# Patient Record
Sex: FEMALE | Race: WHITE | NOT HISPANIC OR LATINO | Employment: UNEMPLOYED | URBAN - METROPOLITAN AREA
[De-identification: names, ages, dates, MRNs, and addresses within clinical notes are randomized per-mention and may not be internally consistent; named-entity substitution may affect disease eponyms.]

---

## 2017-12-18 ENCOUNTER — OFFICE VISIT (OUTPATIENT)
Dept: NEUROSURGERY | Facility: CLINIC | Age: 37
End: 2017-12-18

## 2017-12-18 VITALS
BODY MASS INDEX: 34.43 KG/M2 | TEMPERATURE: 99.6 F | WEIGHT: 170.8 LBS | SYSTOLIC BLOOD PRESSURE: 90 MMHG | HEIGHT: 59 IN | DIASTOLIC BLOOD PRESSURE: 60 MMHG

## 2017-12-18 DIAGNOSIS — M51.36 DEGENERATIVE DISC DISEASE, LUMBAR: Primary | ICD-10-CM

## 2017-12-18 PROCEDURE — 99203 OFFICE O/P NEW LOW 30 MIN: CPT | Performed by: NEUROLOGICAL SURGERY

## 2017-12-18 RX ORDER — QUETIAPINE FUMARATE 100 MG/1
TABLET, FILM COATED ORAL
Refills: 3 | COMMUNITY
Start: 2017-12-13 | End: 2019-05-14 | Stop reason: ALTCHOICE

## 2017-12-18 RX ORDER — OXCARBAZEPINE 300 MG/1
TABLET, FILM COATED ORAL
Refills: 5 | COMMUNITY
Start: 2017-12-02 | End: 2018-07-02 | Stop reason: ALTCHOICE

## 2017-12-18 RX ORDER — FLUCONAZOLE 150 MG/1
TABLET ORAL
Refills: 0 | COMMUNITY
Start: 2017-12-02 | End: 2018-07-02

## 2017-12-18 RX ORDER — RIZATRIPTAN BENZOATE 10 MG/1
TABLET ORAL
Refills: 5 | COMMUNITY
Start: 2017-12-13

## 2017-12-18 RX ORDER — TIZANIDINE 4 MG/1
TABLET ORAL
Refills: 3 | COMMUNITY
Start: 2017-12-11 | End: 2018-07-02 | Stop reason: ALTCHOICE

## 2017-12-18 RX ORDER — CLONAZEPAM 0.5 MG/1
TABLET ORAL
Refills: 0 | COMMUNITY
Start: 2017-12-06 | End: 2020-09-11 | Stop reason: ALTCHOICE

## 2017-12-18 RX ORDER — CIPROFLOXACIN 500 MG/1
TABLET, FILM COATED ORAL
Refills: 0 | COMMUNITY
Start: 2017-12-02 | End: 2018-07-02

## 2017-12-18 RX ORDER — TRAMADOL HYDROCHLORIDE 50 MG/1
TABLET ORAL
COMMUNITY
Start: 2017-12-14 | End: 2018-07-02 | Stop reason: ALTCHOICE

## 2017-12-18 RX ORDER — METOPROLOL SUCCINATE 25 MG/1
TABLET, EXTENDED RELEASE ORAL
Refills: 3 | COMMUNITY
Start: 2017-10-23 | End: 2018-07-02 | Stop reason: DRUGHIGH

## 2017-12-18 RX ORDER — NABUMETONE 750 MG/1
TABLET, FILM COATED ORAL
Refills: 0 | COMMUNITY
Start: 2017-11-27 | End: 2019-05-14 | Stop reason: ALTCHOICE

## 2017-12-18 RX ORDER — METOPROLOL SUCCINATE 100 MG/1
TABLET, EXTENDED RELEASE ORAL
COMMUNITY
Start: 2017-12-12 | End: 2019-05-14 | Stop reason: ALTCHOICE

## 2017-12-18 RX ORDER — TEMAZEPAM 30 MG/1
CAPSULE ORAL
Refills: 5 | COMMUNITY
Start: 2017-12-13 | End: 2018-07-02 | Stop reason: ALTCHOICE

## 2017-12-18 RX ORDER — QUETIAPINE FUMARATE 50 MG/1
TABLET, FILM COATED ORAL
Refills: 3 | COMMUNITY
Start: 2017-11-14 | End: 2018-07-02 | Stop reason: DRUGHIGH

## 2017-12-18 RX ORDER — BUPROPION HYDROCHLORIDE 150 MG/1
150 TABLET, EXTENDED RELEASE ORAL DAILY
Refills: 3 | COMMUNITY
Start: 2017-12-11

## 2017-12-18 RX ORDER — CYCLOBENZAPRINE HCL 10 MG
TABLET ORAL
Refills: 0 | COMMUNITY
Start: 2017-11-27 | End: 2017-12-18

## 2017-12-18 NOTE — PROGRESS NOTES
"Miriam Power  1980  [id number]      Chief Complaint   Patient presents with   • Back Pain   • Leg Pain       HISTORY OF PRESENT ILLNESS:  This is a 36-year-old female who has an approximately 14-15 year history of intermittent back pain which radiates to her left hip into her lower extremity.  She has had several episodes most of which lend themselves to correction with rest and/or chiropractic mobilization.  This is been the most severe she is experienced.  It has not improved over the past 2 weeks.  This was initiated by an attempt to \"workout\" with a .  In her left leg radiating into the back of the leg to her heel on the left side.  She subsequently developed numbness tingling and pain in her right leg associated with bladder incontinence.  A lumbar MRI was performed and she is referred for neurosurgical consultation.    As history is relevant in that she has seizure activity and is on medications.  No precise diagnosis had been forthcoming.  She is also a suspect for multiple sclerosis.  She follows a neurologist every 3 months for follow-up.     Past Medical History:   Diagnosis Date   • Frequent headaches    • Hypertension    • Seizures        Past Surgical History:   Procedure Laterality Date   • BREAST IMPLANT SURGERY     • TUBAL ABDOMINAL LIGATION         Family History   Problem Relation Age of Onset   • No Known Problems Mother    • No Known Problems Father        Social History     Social History   • Marital status:      Spouse name: N/A   • Number of children: N/A   • Years of education: N/A     Occupational History   • Not on file.     Social History Main Topics   • Smoking status: Never Smoker   • Smokeless tobacco: Never Used   • Alcohol use No   • Drug use: No   • Sexual activity: Defer     Other Topics Concern   • Not on file     Social History Narrative   • No narrative on file       Allergies   Allergen Reactions   • Contrast Dye      Only to CT scan dye "         Current Outpatient Prescriptions:   •  buPROPion SR (WELLBUTRIN SR) 150 MG 12 hr tablet, TAKE 1 TABLET BY MOUTH TWICE A DAY, Disp: , Rfl: 3  •  ciprofloxacin (CIPRO) 500 MG tablet, TAKE 1 TABLET TWICE A DAY, Disp: , Rfl: 0  •  clonazePAM (KlonoPIN) 0.5 MG tablet, TAKE 1 TABLET TWICE A DAY BY MOUTH AS NEEDED, Disp: , Rfl: 0  •  cyclobenzaprine (FLEXERIL) 10 MG tablet, TAKE 1 TABLET BY MOUTH 3 TIMES A DAY AS NEEDED, Disp: , Rfl: 0  •  fluconazole (DIFLUCAN) 150 MG tablet, TAKE 1 TABLET BY MOUTH DAILY, Disp: , Rfl: 0  •  metoprolol succinate XL (TOPROL-XL) 100 MG 24 hr tablet, , Disp: , Rfl:   •  metoprolol succinate XL (TOPROL-XL) 25 MG 24 hr tablet, TAKE 1 TABLET TWICE A DAY BY MOUTH, Disp: , Rfl: 3  •  nabumetone (RELAFEN) 750 MG tablet, TAKE 1 TABLET BY MOUTH TWICE A DAY AS NEEDED, Disp: , Rfl: 0  •  OXcarbazepine (TRILEPTAL) 300 MG tablet, TAKE 2 TABLETS BY MOUTH TWICE A DAY, Disp: , Rfl: 5  •  QUEtiapine (SEROquel) 100 MG tablet, TAKE 1 TABLET BY MOUTH AT BEDTIME, Disp: , Rfl: 3  •  QUEtiapine (SEROquel) 50 MG tablet, TAKE 1 TABLET BY MOUTH EVERY DAY AT BEDTIME, Disp: , Rfl: 3  •  rizatriptan (MAXALT) 10 MG tablet, TAKE 1 TAB AS NEEDED FOR HEADACHE. MAY REPEAT 1 TAB WITHIN 2 HOURS. MAX 2 TABS/24HRS. NO OTHER TRIPT, Disp: , Rfl: 5  •  temazepam (RESTORIL) 30 MG capsule, TAKE ONE CAPSULE AT BEDTIME, Disp: , Rfl: 5  •  tiZANidine (ZANAFLEX) 4 MG tablet, TAKE 1 TABLET BY MOUTH EVERY NIGHT AT BEDTIME, Disp: , Rfl: 3  •  topiramate (TOPAMAX) 200 MG tablet, TAKE 1 TABLET AT BEDTIME, Disp: , Rfl: 3  •  traMADol (ULTRAM) 50 MG tablet, , Disp: , Rfl:     Review of Systems   Constitutional: Negative for activity change, appetite change, chills, diaphoresis, fatigue, fever and unexpected weight change.   HENT: Negative for congestion, dental problem, drooling, ear discharge, ear pain, facial swelling, hearing loss, mouth sores, nosebleeds, postnasal drip, rhinorrhea, sinus pressure, sneezing, sore throat, tinnitus,  "trouble swallowing and voice change.    Eyes: Negative for photophobia, pain, discharge, redness, itching and visual disturbance.   Respiratory: Negative for apnea, cough, choking, chest tightness, shortness of breath, wheezing and stridor.    Cardiovascular: Negative for chest pain, palpitations and leg swelling.   Gastrointestinal: Positive for constipation. Negative for abdominal distention, abdominal pain, anal bleeding, blood in stool, diarrhea, nausea, rectal pain and vomiting.   Endocrine: Negative for cold intolerance, heat intolerance, polydipsia, polyphagia and polyuria.   Genitourinary: Positive for difficulty urinating, enuresis, frequency, pelvic pain and urgency. Negative for decreased urine volume, flank pain, genital sores and hematuria.   Musculoskeletal: Positive for arthralgias and back pain. Negative for gait problem, joint swelling, myalgias, neck pain and neck stiffness.   Skin: Negative for color change, pallor, rash and wound.   Allergic/Immunologic: Negative for environmental allergies, food allergies and immunocompromised state.   Neurological: Negative for dizziness, tremors, seizures, syncope, facial asymmetry, speech difficulty, weakness, light-headedness, numbness and headaches.   Hematological: Negative for adenopathy. Does not bruise/bleed easily.   Psychiatric/Behavioral: Positive for dysphoric mood and sleep disturbance. Negative for agitation, behavioral problems, confusion, decreased concentration, hallucinations, self-injury and suicidal ideas. The patient is not nervous/anxious and is not hyperactive.    All other systems reviewed and are negative.      Vitals:    12/18/17 1236   BP: 90/60   BP Location: Right arm   Patient Position: Standing   Cuff Size: Adult   Temp: 99.6 °F (37.6 °C)   TempSrc: Temporal Artery    Weight: 77.5 kg (170 lb 12.8 oz)   Height: 149.9 cm (59\")       Neurological Examination:      Mental status/speech: The patient is alert and oriented.  Speech is " clear without aphysia or dysarthria.  No overt cognitive deficits.    Cranial nerve examination:    Olfaction: Smell is intact.  Vision: Vision is intact without visual field abnormalities.  Funduscopic examination is normal.  No pupillary irregularity.  Ocular motor examination: The extraocular muscles are intact.  There is no diplopia.  The pupil is round and reactive to both light and accommodation.  There is no nystagmus.  Facial movement/sensation: There is no facial weakness.  Sensation is intact in the first, second, and third divisions of the trigeminal nerve.  The corneal reflex is intact.  Auditory: Hearing is intact to finger rub bilaterally.  Cranial nerves IX, X, XI, XII: Phonation is normal.  No dysphagia.  Tongue is protruded in the midline without atrophy.  The gag reflex is intact.  Shoulder shrug is normal.    Musculoligamentous ligamentous examination: She had restriction range of motion lumbar spine.  Straight leg raising, [place] and flip test are negative.  She has a positive Rangel's sign on the left and not on the right.  Her strength appears to be intact.  I find no evidence of weakness, sensory loss or reflex asymmetry.  Her gait is normal.         Medical Decision Making:     Diagnostic Data Set:  The lumbar MRI data set shows mild disc degeneration particularly at L5-S1 with an extreme lateral disc protrusion and narrowing of the neural foramen on the left      Assessment:  Degenerative disc disease          Recommendations:  I am unable to explain the bladder issues or the bilaterality of her symptoms on her examination or the lumbar MRI.  Further studies are warranted given his clinical presentation.  I have ordered a CT of the pelvis; bone scan of the spine including the pelvis as well as EMG/NCV of both lower extremities.  We'll follow her up in the [place] neurosurgery clinic subsequently.    I've send her to physical therapy and [place].  I've taken her off work until further  notice.        I greatly appreciate the opportunity to see and evaluate this individual.  If you have questions or concerns regarding issues that I may have overlooked please call me at any time: [phone].  Nickolas Todd M.D.  [facility]  [address] [facility].  [facility]  [id number]

## 2018-01-08 ENCOUNTER — HOSPITAL ENCOUNTER (OUTPATIENT)
Dept: CT IMAGING | Facility: HOSPITAL | Age: 38
Discharge: HOME | End: 2018-01-08
Attending: NEUROLOGICAL SURGERY

## 2018-01-08 ENCOUNTER — HOSPITAL ENCOUNTER (OUTPATIENT)
Dept: NUCLEAR MEDICINE | Facility: HOSPITAL | Age: 38
Discharge: HOME | End: 2018-01-08
Attending: NEUROLOGICAL SURGERY

## 2018-01-08 PROCEDURE — 78300 BONE IMAGING LIMITED AREA: CPT

## 2018-01-08 PROCEDURE — 74176 CT ABD & PELVIS W/O CONTRAST: CPT | Performed by: RADIOLOGY

## 2018-01-08 PROCEDURE — 0 TECHNETIUM OXIDRONATE KIT: Performed by: NEUROLOGICAL SURGERY

## 2018-01-08 PROCEDURE — 78300 BONE IMAGING LIMITED AREA: CPT | Performed by: RADIOLOGY

## 2018-01-08 PROCEDURE — 74176 CT ABD & PELVIS W/O CONTRAST: CPT

## 2018-01-08 PROCEDURE — A9561 TC99M OXIDRONATE: HCPCS | Performed by: NEUROLOGICAL SURGERY

## 2018-01-08 RX ADMIN — TECHNETIUM TC 99M OXIDRONATE 1 DOSE: 3.15 INJECTION, POWDER, LYOPHILIZED, FOR SOLUTION INTRAVENOUS at 08:45

## 2018-06-22 ENCOUNTER — TRANSCRIBE ORDERS (OUTPATIENT)
Dept: CARDIOLOGY | Facility: CLINIC | Age: 38
End: 2018-06-22

## 2018-06-22 DIAGNOSIS — R06.02 SHORTNESS OF BREATH: Primary | ICD-10-CM

## 2018-06-22 DIAGNOSIS — R53.83 FATIGUE, UNSPECIFIED TYPE: ICD-10-CM

## 2018-07-02 ENCOUNTER — CONSULT (OUTPATIENT)
Dept: CARDIOLOGY | Facility: CLINIC | Age: 38
End: 2018-07-02

## 2018-07-02 VITALS
HEART RATE: 77 BPM | BODY MASS INDEX: 35.28 KG/M2 | HEIGHT: 59 IN | SYSTOLIC BLOOD PRESSURE: 112 MMHG | WEIGHT: 175 LBS | DIASTOLIC BLOOD PRESSURE: 78 MMHG

## 2018-07-02 DIAGNOSIS — R06.02 SHORTNESS OF BREATH: ICD-10-CM

## 2018-07-02 DIAGNOSIS — E78.00 HYPERCHOLESTEREMIA: ICD-10-CM

## 2018-07-02 DIAGNOSIS — I10 ESSENTIAL HYPERTENSION: Primary | ICD-10-CM

## 2018-07-02 DIAGNOSIS — R00.2 PALPITATIONS: ICD-10-CM

## 2018-07-02 DIAGNOSIS — E88.81 METABOLIC SYNDROME: ICD-10-CM

## 2018-07-02 DIAGNOSIS — R07.89 CHEST PRESSURE: ICD-10-CM

## 2018-07-02 DIAGNOSIS — R51.9 HEADACHE AROUND THE EYES: ICD-10-CM

## 2018-07-02 DIAGNOSIS — R00.0 TACHYCARDIA: ICD-10-CM

## 2018-07-02 PROBLEM — E88.810 METABOLIC SYNDROME: Status: ACTIVE | Noted: 2018-07-02

## 2018-07-02 PROCEDURE — 99244 OFF/OP CNSLTJ NEW/EST MOD 40: CPT | Performed by: INTERNAL MEDICINE

## 2018-07-02 PROCEDURE — 93000 ELECTROCARDIOGRAM COMPLETE: CPT | Performed by: INTERNAL MEDICINE

## 2018-07-02 RX ORDER — ATORVASTATIN CALCIUM 10 MG/1
10 TABLET, FILM COATED ORAL DAILY
Qty: 30 TABLET | Refills: 11 | Status: SHIPPED | OUTPATIENT
Start: 2018-07-02 | End: 2019-05-14 | Stop reason: SDUPTHER

## 2018-07-02 RX ORDER — ONDANSETRON 4 MG/1
4 TABLET, FILM COATED ORAL EVERY 8 HOURS PRN
COMMUNITY

## 2018-07-02 RX ORDER — FLUTICASONE PROPIONATE 50 MCG
2 SPRAY, SUSPENSION (ML) NASAL DAILY PRN
COMMUNITY
End: 2020-09-11 | Stop reason: ALTCHOICE

## 2018-07-02 RX ORDER — FUROSEMIDE 20 MG/1
20 TABLET ORAL DAILY
COMMUNITY
End: 2019-05-14 | Stop reason: ALTCHOICE

## 2018-07-02 RX ORDER — PROMETHAZINE HYDROCHLORIDE 25 MG/1
25 TABLET ORAL EVERY 6 HOURS PRN
COMMUNITY

## 2018-07-02 NOTE — PATIENT INSTRUCTIONS
Mediterranean Diet  A Mediterranean diet refers to food and lifestyle choices that are based on the traditions of countries located on the [place]. This way of eating has been shown to help prevent certain conditions and improve outcomes for people who have chronic diseases, like kidney disease and heart disease.  What are tips for following this plan?  Lifestyle  · Cook and eat meals together with your family, when possible.  · Drink enough fluid to keep your urine clear or pale yellow.  · Be physically active every day. This includes:  ? Aerobic exercise like running or swimming.  ? Leisure activities like gardening, walking, or housework.  · Get 7-8 hours of sleep each night.  · If recommended by your health care provider, drink red wine in moderation. This means 1 glass a day for nonpregnant women and 2 glasses a day for men. A glass of wine equals 5 oz (150 mL).  Reading food labels  · Check the serving size of packaged foods. For foods such as rice and pasta, the serving size refers to the amount of cooked product, not dry.  · Check the total fat in packaged foods. Avoid foods that have saturated fat or trans fats.  · Check the ingredients list for added sugars, such as corn syrup.  Shopping  · At the grocery store, buy most of your food from the areas near the walls of the store. This includes:  ? Fresh fruits and vegetables (produce).  ? Grains, beans, nuts, and seeds. Some of these may be available in unpackaged forms or large amounts (in bulk).  ? Fresh seafood.  ? Poultry and eggs.  ? Low-fat dairy products.  · Buy whole ingredients instead of prepackaged foods.  · Buy fresh fruits and vegetables in-season from local farmers markets.  · Buy frozen fruits and vegetables in resealable bags.  · If you do not have access to quality fresh seafood, buy precooked frozen shrimp or canned fish, such as tuna, salmon, or sardines.  · Buy small amounts of raw or cooked vegetables, salads, or olives from the  deli or salad bar at your store.  · Stock your pantry so you always have certain foods on hand, such as olive oil, canned tuna, canned tomatoes, rice, pasta, and beans.  Cooking  · Cook foods with extra-virgin olive oil instead of using butter or other vegetable oils.  · Have meat as a side dish, and have vegetables or grains as your main dish. This means having meat in small portions or adding small amounts of meat to foods like pasta or stew.  · Use beans or vegetables instead of meat in common dishes like chili or lasagna.  · [place] with different cooking methods. Try roasting or broiling vegetables instead of steaming or sautéeing them.  · Add frozen vegetables to soups, stews, pasta, or rice.  · Add nuts or seeds for added healthy fat at each meal. You can add these to yogurt, salads, or vegetable dishes.  · Marinate fish or vegetables using olive oil, lemon juice, garlic, and fresh herbs.  Meal planning  · Plan to eat 1 vegetarian meal one day each week. Try to work up to 2 vegetarian meals, if possible.  · Eat seafood 2 or more times a week.  · Have healthy snacks readily available, such as:  ? Vegetable sticks with hummus.  ? Greek yogurt.  ? Fruit and nut trail mix.  · Eat balanced meals throughout the week. This includes:  ? Fruit: 2-3 servings a day  ? Vegetables: 4-5 servings a day  ? Low-fat dairy: 2 servings a day  ? Fish, poultry, or lean meat: 1 serving a day  ? Beans and legumes: 2 or more servings a week  ? Nuts and seeds: 1-2 servings a day  ? Whole grains: 6-8 servings a day  ? Extra-virgin olive oil: 3-4 servings a day  · Limit red meat and sweets to only a few servings a month  What are my food choices?  · Mediterranean diet  ? Recommended  ? Grains: Whole-grain pasta. Brown rice. Bulgar wheat. Polenta. Couscous. Whole-wheat bread. Oatmeal. Quinoa.  ? Vegetables: Artichokes. Beets. Broccoli. Cabbage. Carrots. Eggplant. Green beans. Chard. Kale. Spinach. Onions. Leeks. Peas. Squash.  Tomatoes. Peppers. Radishes.  ? Fruits: Apples. Apricots. Avocado. Berries. Bananas. Cherries. Dates. Figs. Grapes. Janeth. Melon. Oranges. Peaches. Plums. Pomegranate.  ? Meats and other protein foods: Beans. Almonds. Sunflower seeds. Pine nuts. Peanuts. Cod. [place]. Scallops. Shrimp. Tuna. Tilapia. Clams. Oysters. Eggs.  ? Dairy: Low-fat milk. Cheese. Greek yogurt.  ? Beverages: Water. Red wine. Herbal tea.  ? Fats and oils: Extra virgin olive oil. Avocado oil. Grape seed oil.  ? Sweets and desserts: Greek yogurt with honey. Baked apples. Poached pears. Trail mix.  ? Seasoning and other foods: Basil. Cilantro. Coriander. Cumin. Mint. Parsley. Ronny. Rosemary. Tarragon. Garlic. Oregano. Thyme. Pepper. Balsalmic vinegar. Tahini. Hummus. Tomato sauce. Olives. Mushrooms.  ? Limit these  ? Grains: Prepackaged pasta or rice dishes. Prepackaged cereal with added sugar.  ? Vegetables: Deep fried potatoes (french fries).  ? Fruits: Fruit canned in syrup.  ? Meats and other protein foods: Beef. Pork. Lamb. Poultry with skin. Hot dogs. Munoz.  ? Dairy: Ice cream. Sour cream. Whole milk.  ? Beverages: Juice. Sugar-sweetened soft drinks. Beer. Liquor and spirits.  ? Fats and oils: Butter. Canola oil. Vegetable oil. Beef fat (tallow). Lard.  ? Sweets and desserts: Cookies. Cakes. Pies. Candy.  ? Seasoning and other foods: Mayonnaise. Premade sauces and marinades.  ? The items listed may not be a complete list. Talk with your dietitian about what dietary choices are right for you.  Summary  · The Mediterranean diet includes both food and lifestyle choices.  · Eat a variety of fresh fruits and vegetables, beans, nuts, seeds, and whole grains.  · Limit the amount of red meat and sweets that you eat.  · Talk with your health care provider about whether it is safe for you to drink red wine in moderation. This means 1 glass a day for nonpregnant women and 2 glasses a day for men. A glass of wine equals 5 oz (150 mL).  This information  is not intended to replace advice given to you by your health care provider. Make sure you discuss any questions you have with your health care provider.  Document Released: 08/10/2017 Document Revised: 09/12/2017 Document Reviewed: 08/10/2017  [employer] Interactive Patient Education © 2018 [employer]

## 2018-07-02 NOTE — PROGRESS NOTES
CARDIAC COMPLAINTS  chest pressure/discomfort, dyspnea, fatigue, lower extremity edema and palpitations      Subjective   Miriam Power is a 37 y.o. female came in today for her initial cardiac evaluation.  She has history of hypertension diagnosed recently.  She also has history of significant depression, history of recurrent left-sided headache which sometime associated with partial complex seizures.  She also has been gaining weight recently.  She has been having some abdominal discomfort and has undergone EGD and colonoscopy also.  She started working out at the gym with one of her friends.  She started noticing during the workout, episodes of chest tightness in the form of pressure-like feeling associated with increasing shortness of breath and nausea.  These episodes results after resting for a few minutes.  She had 3 episodes of this symptoms.  She stopped going to the gym since then.  She also has been noticing increasing shortness of breath for the last few months.  She has been noticing bilateral leg edema in both lower extremities.  She also has been having episodes of palpitation in the form of rapid heartbeat.  She has undergone overnight pulse PO2 measurement and sleep apnea has been ruled out.  She has undergone some lab work at your office.  Her total cholesterol was 177, LDL of 107 and triglyceride of 158.  Her creatinine is slightly elevated at 1.45 with a GFR of 43.  Her TSH was normal.  I don't think she has undergone a UA at this time.  She is not a smoker and hypercholesterolemia runs in the family.    Past Surgical History:   Procedure Laterality Date   • BREAST IMPLANT SURGERY     • ECHO - CONVERTED  11/15/2015    @[email or web address]. - EF 60%   • TUBAL ABDOMINAL LIGATION         Current Outpatient Prescriptions   Medication Sig Dispense Refill   • buPROPion SR (WELLBUTRIN SR) 150 MG 12 hr tablet TAKE 1 TABLET BY MOUTH TWICE A DAY  3   • clonazePAM (KlonoPIN) 0.5 MG tablet TAKE 1 TABLET  Three times  A DAY BY MOUTH AS NEEDED  0   • fluticasone (FLONASE) 50 MCG/ACT nasal spray 2 sprays into each nostril Daily As Needed for Rhinitis.     • furosemide (LASIX) 20 MG tablet Take 20 mg by mouth Daily.     • metoprolol succinate XL (TOPROL-XL) 100 MG 24 hr tablet 1/2 tab daily     • nabumetone (RELAFEN) 750 MG tablet TAKE 1 TABLET BY MOUTH TWICE A DAY AS NEEDED  0   • ondansetron (ZOFRAN) 4 MG tablet Take 4 mg by mouth Every 8 (Eight) Hours As Needed for Nausea or Vomiting.     • promethazine (PHENERGAN) 25 MG tablet Take 25 mg by mouth Every 6 (Six) Hours As Needed for Nausea or Vomiting.     • QUEtiapine (SEROquel) 100 MG tablet TAKE 1 TABLET BY MOUTH AT BEDTIME  3   • rizatriptan (MAXALT) 10 MG tablet TAKE 1 TAB AS NEEDED FOR HEADACHE. MAY REPEAT 1 TAB WITHIN 2 HOURS. MAX 2 TABS/24HRS. NO OTHER TRIPT  5   • topiramate (TOPAMAX) 200 MG tablet TAKE 1 TABLET twice a day  3   • aspirin 81 MG tablet Take 1 tablet by mouth Daily. 30 tablet 11   • atorvastatin (LIPITOR) 10 MG tablet Take 1 tablet by mouth Daily. 30 tablet 11     No current facility-administered medications for this visit.            ALLERGIES:  Contrast dye    Past Medical History:   Diagnosis Date   • Frequent headaches    • History of suicide attempt    • History of tubal ligation    • Hypertension    • Hyperthyroidism    • Panic attack    • Seizures    • Vitamin D deficiency        History   Smoking Status   • Never Smoker   Smokeless Tobacco   • Never Used          Family History   Problem Relation Age of Onset   • Hyperlipidemia Mother    • No Known Problems Father    • No Known Problems Brother        Review of Systems   Constitution: Positive for malaise/fatigue and weight gain. Negative for decreased appetite.   HENT: Negative for congestion and sore throat.    Eyes: Negative for blurred vision.   Cardiovascular: Positive for chest pain, dyspnea on exertion and palpitations.   Respiratory: Positive for shortness of breath. Negative for  "snoring.    Endocrine: Negative for cold intolerance and heat intolerance.   Hematologic/Lymphatic: Negative for adenopathy. Does not bruise/bleed easily.   Skin: Negative for itching, nail changes and skin cancer.   Musculoskeletal: Positive for arthritis. Negative for myalgias.   Gastrointestinal: Negative for abdominal pain, dysphagia and heartburn.   Genitourinary: Negative for bladder incontinence and frequency.   Neurological: Negative for dizziness, light-headedness, seizures and vertigo.   Psychiatric/Behavioral: Positive for depression and suicidal ideas. Negative for altered mental status.   Allergic/Immunologic: Negative for environmental allergies and hives.       Diabetes- No  Thyroid- normal    Objective     /78 (BP Location: Right arm)   Pulse 77   Ht 149.9 cm (59\")   Wt 79.4 kg (175 lb)   BMI 35.35 kg/m²     Physical Exam   Constitutional: She is oriented to person, place, and time. She appears well-developed and well-nourished.   HENT:   Head: Normocephalic.   Nose: Nose normal.   Eyes: EOM are normal. Pupils are equal, round, and reactive to light.   Neck: Normal range of motion. Neck supple.   Cardiovascular: Normal rate, regular rhythm, S1 normal and S2 normal.    Murmur heard.  Pulmonary/Chest: Effort normal and breath sounds normal.   Abdominal: Soft. Bowel sounds are normal.   Musculoskeletal: Normal range of motion. She exhibits no edema.   Neurological: She is alert and oriented to person, place, and time.   Skin: Skin is warm and dry.   Psychiatric: She has a normal mood and affect.         ECG 12 Lead  Date/Time: 7/2/2018 12:07 PM  Performed by: ROSSY PETERSON  Authorized by: ROSSY PETERSON   Previous ECG: no previous ECG available  Rhythm: sinus rhythm  Rate: normal  QRS axis: normal  Clinical impression: non-specific ECG              Assessment/Plan   Patient's Body mass index is 35.35 kg/m². BMI is above normal parameters. Recommendations include: educational " material, exercise counseling and nutrition counseling.     Miriam was seen today for establish care, chest pain, shortness of breath, edema, rapid heart rate, sleep apnea and labs.    Diagnoses and all orders for this visit:    Essential hypertension    Palpitations  -     Stress Test With Myocardial Perfusion One Day; Future    Hypercholesteremia  -     atorvastatin (LIPITOR) 10 MG tablet; Take 1 tablet by mouth Daily.    Chest pressure  -     Stress Test With Myocardial Perfusion One Day; Future    Shortness of breath  -     Adult Transthoracic Echo Complete W/ Cont if Necessary Per Protocol; Future    Tachycardia  -     Adult Transthoracic Echo Complete W/ Cont if Necessary Per Protocol; Future    Metabolic syndrome    Headache around the eyes  -     Adult Transthoracic Echo Complete W/ Cont if Necessary Per Protocol; Future    At baseline, her heart rate and blood pressure appears stable.  Her BMI is 35.  Her EKG showed normal sinus rhythm with nonspecific ST-T changes.  Had a long talk with her about diet, exercise and weight reduction.  I talked to her about the Mediterranean diet.  Her clinical examination reveals short systolic murmur at the mitral area.  There is no edema at this time.  I talked to her about avoiding Lasix.  She may need a UA to rule out microalbuminuria.  I scheduled her to undergo an echocardiogram to evaluate the LV function, valvular structures and the PA pressure.  Also scheduled her to undergo a stress test to evaluate her functional status, chronotropic response and also to rule out stress-induced ischemia.  Regarding her hypercholesterolemia, I started her on Lipitor 10 mg once a day.  I also advised her to be on aspirin 81 mg once a day.  Based on the results of these tests, further recommendations will be made.                Electronically signed by Rangel Alfred MD July 2, 2018 11:58 AM

## 2018-07-12 ENCOUNTER — HOSPITAL ENCOUNTER (OUTPATIENT)
Dept: CARDIOLOGY | Facility: HOSPITAL | Age: 38
Discharge: HOME | End: 2018-07-12
Attending: INTERNAL MEDICINE

## 2018-07-12 ENCOUNTER — OUTSIDE FACILITY SERVICE (OUTPATIENT)
Dept: CARDIOLOGY | Facility: CLINIC | Age: 38
End: 2018-07-12

## 2018-07-12 DIAGNOSIS — R00.2 PALPITATIONS: ICD-10-CM

## 2018-07-12 DIAGNOSIS — R06.02 SHORTNESS OF BREATH: ICD-10-CM

## 2018-07-12 DIAGNOSIS — R51.9 HEADACHE AROUND THE EYES: ICD-10-CM

## 2018-07-12 DIAGNOSIS — R00.0 TACHYCARDIA: ICD-10-CM

## 2018-07-12 DIAGNOSIS — R07.89 CHEST PRESSURE: ICD-10-CM

## 2018-07-12 LAB
MAXIMAL PREDICTED HEART RATE: 183 BPM
MAXIMAL PREDICTED HEART RATE: 183 BPM
STRESS TARGET HR: 156 BPM
STRESS TARGET HR: 156 BPM

## 2018-07-12 PROCEDURE — 93018 CV STRESS TEST I&R ONLY: CPT | Performed by: INTERNAL MEDICINE

## 2018-07-12 PROCEDURE — 78452 HT MUSCLE IMAGE SPECT MULT: CPT | Performed by: INTERNAL MEDICINE

## 2018-07-12 PROCEDURE — A9500 TC99M SESTAMIBI: HCPCS | Performed by: INTERNAL MEDICINE

## 2018-07-12 PROCEDURE — 78452 HT MUSCLE IMAGE SPECT MULT: CPT

## 2018-07-12 PROCEDURE — 93306 TTE W/DOPPLER COMPLETE: CPT | Performed by: INTERNAL MEDICINE

## 2018-07-12 PROCEDURE — 93017 CV STRESS TEST TRACING ONLY: CPT

## 2018-07-12 PROCEDURE — 0 TECHNETIUM SESTAMIBI: Performed by: INTERNAL MEDICINE

## 2018-07-12 PROCEDURE — 93306 TTE W/DOPPLER COMPLETE: CPT

## 2018-07-12 RX ADMIN — TECHNETIUM TC 99M SESTAMIBI 1 DOSE: 1 INJECTION INTRAVENOUS at 14:30

## 2018-07-17 ENCOUNTER — TELEPHONE (OUTPATIENT)
Dept: CARDIOLOGY | Facility: CLINIC | Age: 38
End: 2018-07-17

## 2018-07-17 RX ORDER — LISINOPRIL 5 MG/1
5 TABLET ORAL DAILY
Qty: 90 TABLET | Refills: 3 | Status: SHIPPED | OUTPATIENT
Start: 2018-07-17 | End: 2019-05-14 | Stop reason: ALTCHOICE

## 2018-07-17 NOTE — TELEPHONE ENCOUNTER
Patient aware of stress test and echo bubble study results and recommendations.    Echo bubble study is normal.  Normal LV function. Cannot rule out anterior wall ischemia versus breast attenuation, hypertensive BP response.  Add Lisinopril 5 mg once a day and patient aware to call the office if symptoms persist.

## 2019-05-09 ENCOUNTER — TELEPHONE (OUTPATIENT)
Dept: CARDIOLOGY | Facility: CLINIC | Age: 39
End: 2019-05-09

## 2019-05-10 NOTE — TELEPHONE ENCOUNTER
I spoke with patient.  She reports she seen CARLOS Bah yesterday.  CXR and labs done.  Per patient, Monica called her last night and advised she needs a follow up here and may need repeat echo.  She has been having problems with edema, SOA.  She reports PCP started her on Bumex 1-2 weeks ago, but still having problems.    Patient aware follow up-next Tuesday 5/14/19 at 9:45 am with CARLOS Escobedo.

## 2019-05-10 NOTE — TELEPHONE ENCOUNTER
Left message at PCP office requesting last office visit and any labs or recent testing be faxed to our office.

## 2019-05-14 ENCOUNTER — OFFICE VISIT (OUTPATIENT)
Dept: CARDIOLOGY | Facility: CLINIC | Age: 39
End: 2019-05-14

## 2019-05-14 VITALS
WEIGHT: 217 LBS | HEART RATE: 88 BPM | HEIGHT: 59 IN | SYSTOLIC BLOOD PRESSURE: 112 MMHG | BODY MASS INDEX: 43.75 KG/M2 | DIASTOLIC BLOOD PRESSURE: 76 MMHG

## 2019-05-14 DIAGNOSIS — E78.00 HYPERCHOLESTEREMIA: ICD-10-CM

## 2019-05-14 DIAGNOSIS — R06.02 SHORTNESS OF BREATH: ICD-10-CM

## 2019-05-14 DIAGNOSIS — N04.9 NEPHROTIC SYNDROME: ICD-10-CM

## 2019-05-14 DIAGNOSIS — R07.89 CHEST PRESSURE: ICD-10-CM

## 2019-05-14 DIAGNOSIS — R60.0 BILATERAL LEG EDEMA: ICD-10-CM

## 2019-05-14 DIAGNOSIS — I10 ESSENTIAL HYPERTENSION: Primary | ICD-10-CM

## 2019-05-14 DIAGNOSIS — R94.39 ABNORMAL NUCLEAR STRESS TEST: ICD-10-CM

## 2019-05-14 DIAGNOSIS — E88.81 METABOLIC SYNDROME: ICD-10-CM

## 2019-05-14 PROCEDURE — 99214 OFFICE O/P EST MOD 30 MIN: CPT | Performed by: NURSE PRACTITIONER

## 2019-05-14 RX ORDER — QUETIAPINE 400 MG/1
400 TABLET, FILM COATED, EXTENDED RELEASE ORAL NIGHTLY
COMMUNITY
End: 2020-09-11 | Stop reason: ALTCHOICE

## 2019-05-14 RX ORDER — TRAMADOL HYDROCHLORIDE 50 MG/1
50 TABLET ORAL 4 TIMES DAILY PRN
COMMUNITY
End: 2020-09-11 | Stop reason: ALTCHOICE

## 2019-05-14 RX ORDER — METOPROLOL TARTRATE 50 MG/1
50 TABLET, FILM COATED ORAL 2 TIMES DAILY
COMMUNITY
End: 2020-03-19 | Stop reason: SDUPTHER

## 2019-05-14 RX ORDER — GABAPENTIN 400 MG/1
400 CAPSULE ORAL 2 TIMES DAILY
COMMUNITY
End: 2020-09-11 | Stop reason: ALTCHOICE

## 2019-05-14 RX ORDER — BUMETANIDE 1 MG/1
1 TABLET ORAL DAILY
COMMUNITY
End: 2020-09-11 | Stop reason: ALTCHOICE

## 2019-05-14 RX ORDER — ATORVASTATIN CALCIUM 10 MG/1
10 TABLET, FILM COATED ORAL DAILY
Qty: 90 TABLET | Refills: 3 | Status: SHIPPED | OUTPATIENT
Start: 2019-05-14 | End: 2021-03-11 | Stop reason: SDUPTHER

## 2019-05-14 RX ORDER — METHOCARBAMOL 750 MG/1
750 TABLET, FILM COATED ORAL 2 TIMES DAILY PRN
COMMUNITY
End: 2020-09-11 | Stop reason: ALTCHOICE

## 2019-05-14 RX ORDER — ALBUTEROL SULFATE 90 UG/1
2 AEROSOL, METERED RESPIRATORY (INHALATION) EVERY 6 HOURS PRN
COMMUNITY

## 2019-05-14 RX ORDER — AMITRIPTYLINE HYDROCHLORIDE 25 MG/1
25 TABLET, FILM COATED ORAL NIGHTLY
COMMUNITY
End: 2020-09-11 | Stop reason: ALTCHOICE

## 2019-05-14 NOTE — PROGRESS NOTES
Chief Complaint   Patient presents with   • Follow-up     Cardiac management. Last labs 5/9/19 per PCP.   • Edema     Started about 3 weeks ago, having swelling in legs and feet. She reports PCP obtained chest xray, was told had enlarged heart, PCP wanted another echo. See encounter 5/10/19.   • Shortness of Breath     Has with minimal exertion. Has had cough for about 3 weeks.   • Med Refill     Needs refill on Atorvastatin-90 day.       Subjective       Miriam Power is a 38 y.o. female with history of mild HTN, depression, recurrent headaches with associated partial complex seizure who was referred for her initial cardiac evaluation in July 2018 secondary to weight gain, chest tightness, shortness of breath, and edema.  Prior to consult, she underwent EGD and colonoscopy which were unremarkable and overnight pulse ox ruled out sleep apnea.  Labs per PCP on 6/14/18 showed normal TSH, glucose 94, BUN/CR 21/1.45, GFR 43.  , , , HDL 42, BNP 31.  CBC was normal.  Cardiac work-up with stress test and echocardiogram with bubble on 7/12/2018 showed normal LVEF, no shunt, normal cardiac size, no diastolic dysfunction.  Stress test showed moderate impairment of exercise tolerance, hypertensive blood pressure response with peak /69, and questionable changes in the anterior wall felt to be related to breast attenuation but could not rule out ischemia.  Lisinopril 5 mg daily added with plan for cardiac cath if she continued to have symptoms.  We did not see her after that.  She then called the office on 5/9/2019 reporting worsening shortness of breath, edema, and chest tightness.  CXR and labs showed mild atelectasis left lobe, BNP 81, H&H 12/35.4, glucose 121, BUN/CR 19/1.1, GFR improved to 58, LFT normal, albumin low at 3.0, TSH 0.84.  , , HDL 39, . US showed mild proteinuria. She can't remember having 24 hr urine.  She reports insulin level was high.  She came in today to be  evaluated.  She reports previously sed rate and RA work-up was negative.  Her chief complaints are weight gain, pedal edema which has been 2-3+ over the last few weeks, shortness of breath with minimal exertion associated with chest tightness.  PCP changed to Lasix to Bumex and edema improved.  She is also following with Dr. Vasquez who took her off of lisinopril and started metoprolol. She reports  lb weight gain in one year.    HPI         Cardiac History:    Past Surgical History:   Procedure Laterality Date   • BREAST IMPLANT SURGERY     • CARDIOVASCULAR STRESS TEST  07/12/2018    5 Min, 11 secs, 7.0 METS. 83% THR. BP- 190/69. R/O Anterior Ischemia.   • ECHO - CONVERTED  11/15/2015    @[facility]. - EF 60%   • ECHO - CONVERTED  07/12/2018    EF 65%. No shunt   • TUBAL ABDOMINAL LIGATION         Current Outpatient Medications   Medication Sig Dispense Refill   • albuterol sulfate  (90 Base) MCG/ACT inhaler Inhale 2 puffs Every 6 (Six) Hours As Needed for Wheezing.     • amitriptyline (ELAVIL) 25 MG tablet Take 25 mg by mouth Every Night.     • aspirin 81 MG tablet Take 1 tablet by mouth Daily. 30 tablet 11   • atorvastatin (LIPITOR) 10 MG tablet Take 1 tablet by mouth Daily. 90 tablet 3   • bumetanide (BUMEX) 1 MG tablet Take 1 mg by mouth Daily.     • buPROPion SR (WELLBUTRIN SR) 150 MG 12 hr tablet TAKE 1 TABLET BY MOUTH TWICE A DAY  3   • clonazePAM (KlonoPIN) 0.5 MG tablet TAKE 1 TABLET Three times  A DAY BY MOUTH AS NEEDED  0   • fluticasone (FLONASE) 50 MCG/ACT nasal spray 2 sprays into each nostril Daily As Needed for Rhinitis.     • gabapentin (NEURONTIN) 400 MG capsule Take 400 mg by mouth 2 (Two) Times a Day.     • metFORMIN (GLUCOPHAGE) 500 MG tablet Take 500 mg by mouth Daily.     • methocarbamol (ROBAXIN) 750 MG tablet Take 750 mg by mouth 2 (Two) Times a Day As Needed for Muscle Spasms.     • metoprolol tartrate (LOPRESSOR) 50 MG tablet Take 50 mg by mouth 2 (Two) Times a Day.     •  Nystatin (MAGIC MOUTHWASH) Swish and spit Daily.     • ondansetron (ZOFRAN) 4 MG tablet Take 4 mg by mouth Every 8 (Eight) Hours As Needed for Nausea or Vomiting.     • promethazine (PHENERGAN) 25 MG tablet Take 25 mg by mouth Every 6 (Six) Hours As Needed for Nausea or Vomiting.     • QUEtiapine XR (SEROquel XR) 400 MG 24 hr tablet Take 400 mg by mouth Every Night.     • rizatriptan (MAXALT) 10 MG tablet TAKE 1 TAB AS NEEDED FOR HEADACHE. MAY REPEAT 1 TAB WITHIN 2 HOURS. MAX 2 TABS/24HRS. NO OTHER TRIPT  5   • traMADol (ULTRAM) 50 MG tablet Take 50 mg by mouth 4 (Four) Times a Day As Needed for Moderate Pain .       No current facility-administered medications for this visit.      Contrast dye    Past Medical History:   Diagnosis Date   • Frequent headaches    • History of suicide attempt    • History of tubal ligation    • Hypertension    • Hyperthyroidism    • Panic attack    • Seizures (CMS/HCC)    • Trigeminal neuralgia    • Vitamin D deficiency      Social History     Socioeconomic History   • Marital status:      Spouse name: Not on file   • Number of children: Not on file   • Years of education: Not on file   • Highest education level: Not on file   Tobacco Use   • Smoking status: Never Smoker   • Smokeless tobacco: Never Used   Substance and Sexual Activity   • Alcohol use: No   • Drug use: No   • Sexual activity: Defer       Family History   Problem Relation Age of Onset   • Hyperlipidemia Mother    • No Known Problems Father    • No Known Problems Brother      Review of Systems   Constitution: Positive for weakness, malaise/fatigue and weight gain. Negative for decreased appetite.   HENT: Negative.    Eyes: Negative.    Cardiovascular: Positive for chest pain, dyspnea on exertion, leg swelling and orthopnea. Negative for palpitations and syncope.   Respiratory: Positive for cough and shortness of breath.    Endocrine: Negative.    Hematologic/Lymphatic: Negative.    Skin: Negative.   "  Musculoskeletal: Positive for back pain (recent thoracic spine injection), joint pain and joint swelling. Negative for myalgias.   Gastrointestinal: Negative for abdominal pain, change in bowel habit and melena.   Genitourinary: Negative for dysuria and hematuria.   Neurological: Negative for dizziness.   Psychiatric/Behavioral: Positive for depression (by history). Negative for altered mental status.   Allergic/Immunologic: Negative.         Diabetes- No  Thyroid-normal    Objective     /76 (BP Location: Left arm)   Pulse 88   Ht 149.9 cm (59.02\")   Wt 98.4 kg (217 lb)   BMI 43.80 kg/m²     Physical Exam   Constitutional: She is oriented to person, place, and time. She appears well-developed and well-nourished. No distress.   HENT:   Head: Normocephalic and atraumatic.   Eyes: Pupils are equal, round, and reactive to light.   Neck: Normal range of motion. Neck supple.   Cardiovascular: Normal rate, regular rhythm and intact distal pulses.   Pulmonary/Chest: Effort normal and breath sounds normal. No respiratory distress. She has no wheezes. She has no rales.   Abdominal: Soft. Bowel sounds are normal. She exhibits no distension.   Musculoskeletal: Normal range of motion. She exhibits edema (trace at best ).   Neurological: She is alert and oriented to person, place, and time.   Skin: Skin is warm and dry. She is not diaphoretic.   Psychiatric: She has a normal mood and affect.   Nursing note and vitals reviewed.     Procedures          Assessment/Plan    Heart rate and blood pressure are stable. We reviewed all reports including stress, echo, labs, CXR. She appears to have nephrotic syndrome with edema, proteinuria, low albumin, and hyperlipidemia. Her LV function is normal with normal heart size. Stress test did show anterior wall hypoperfusion which is likely from breast attenuation but the possibility of ischemia cannot be ruled out. We discussed cardiac cath for definitive diagnosis and she wishes " to proceed. We will discuss with Dr. Alfred and get her scheduled. She does have allergy to contrast dye, so she may need premedication. Edema has improved with Bumex, so continue the same. Lipids remain elevated. Will refill Lipitor and get her started back on that. She has seen Dr. Vasquez twice and he took her off lisinopril and started metoprolol. No changes made today. She may need a 24 hour urine for protein. We had a long discussion about diet, limiting sugars, carbohydrates, and restricting sodium to 1500 mg daily. Elevate legs while sitting. Use of compression stockings will be beneficial. She is advised to drink water only, avoid soda, avoid fast food. Short periods of walking is encouraged and increase as she can tolerate. Further recommendations to follow cardiac cath. We will see her back after the procedure.      Miriam was seen today for follow-up, edema, shortness of breath and med refill.    Diagnoses and all orders for this visit:    Essential hypertension  -     [facility]; Future    Hypercholesteremia  -     atorvastatin (LIPITOR) 10 MG tablet; Take 1 tablet by mouth Daily.  -     [facility]; Future    Shortness of breath  -     [facility]; Future    Chest pressure  -     [facility]; Future    Metabolic syndrome  -     [facility]; Future    Bilateral leg edema  -     [facility]; Future    Abnormal nuclear stress test  -     [facility]; Future    Nephrotic syndrome        Patient's Body mass index is 43.8 kg/m². BMI is above normal parameters. Recommendations include: nutrition counseling.               Electronically signed by CARLOS Bender,  May 15, 2019 8:37 AM

## 2019-05-15 ENCOUNTER — TELEPHONE (OUTPATIENT)
Dept: CARDIOLOGY | Facility: CLINIC | Age: 39
End: 2019-05-15

## 2019-05-15 DIAGNOSIS — R07.89 CHEST PAIN, ATYPICAL: ICD-10-CM

## 2019-05-15 DIAGNOSIS — R94.39 ABNORMAL NUCLEAR STRESS TEST: Primary | ICD-10-CM

## 2019-05-15 DIAGNOSIS — I10 ESSENTIAL HYPERTENSION: ICD-10-CM

## 2019-05-15 DIAGNOSIS — R60.0 BILATERAL LEG EDEMA: ICD-10-CM

## 2019-05-15 DIAGNOSIS — R00.2 PALPITATIONS: ICD-10-CM

## 2019-05-15 DIAGNOSIS — E78.00 HYPERCHOLESTEREMIA: ICD-10-CM

## 2019-05-15 DIAGNOSIS — E88.81 METABOLIC SYNDROME: ICD-10-CM

## 2019-05-15 DIAGNOSIS — R06.02 SHORTNESS OF BREATH: ICD-10-CM

## 2019-05-15 DIAGNOSIS — R00.0 TACHYCARDIA: ICD-10-CM

## 2019-05-15 PROBLEM — N04.9 NEPHROTIC SYNDROME: Status: ACTIVE | Noted: 2019-05-15

## 2019-05-15 NOTE — TELEPHONE ENCOUNTER
April,     I placed order for cath. Can we get her scheduled?     She may need right and left since her CC is sob.     She also has hx of contrast allergy. Will need premedication.     Dr. Alfred,    Does she need right and left for SOB?

## 2019-05-16 NOTE — TELEPHONE ENCOUNTER
Can we repeat the nuclear stress and echo? If she continues to show ischemia and remains symptomatic, then we will try for cath.     I will place order.

## 2019-05-16 NOTE — TELEPHONE ENCOUNTER
I spoke with [facility] regarding patient's PA for RHC/LHC.  Case has been denied.    I asked the nurse reviewer if it was denied because her nuclear stress test is not recent (Doreen wants within last 60 days). He said combination of that, her age, risk factors, no known CAD....  [facility]'s process for doing a peer to peer is we call when you are available.  They do not schedule a set time for peer to peer.    If you want to do a peer to peer we could Friday or whenever is good for you.

## 2019-05-16 NOTE — TELEPHONE ENCOUNTER
Patient aware that [place] denied cardiac cath.  Patient is willing to proceed with stress test and echo.  She understands once PA is obtained that scheduling will call her to set up at [facility].

## 2019-05-20 ENCOUNTER — TELEPHONE (OUTPATIENT)
Dept: CARDIOLOGY | Facility: CLINIC | Age: 39
End: 2019-05-20

## 2019-05-20 RX ORDER — NITROGLYCERIN 0.4 MG/1
0.4 TABLET SUBLINGUAL
Qty: 25 TABLET | Refills: 1 | Status: SHIPPED | OUTPATIENT
Start: 2019-05-20 | End: 2021-06-01 | Stop reason: SDUPTHER

## 2019-05-20 NOTE — TELEPHONE ENCOUNTER
OK, agree with ER if chest pain is severe.    Sl nitro 0.4 mg PRN    We will see what stress shows

## 2019-05-20 NOTE — TELEPHONE ENCOUNTER
"Patient called stating \"I have been having squeezing chest pain radiating to left shoulder with nausea for the last 3 days, throat tightness feels like I can't get air at times\". Patient was advised to go to ER, she states \" I have been able to lay down and pain ease except one time it has not\".  Patient does not have Nitro for pain.    She has stress and echo scheduled 5/28/19.  "

## 2019-05-20 NOTE — TELEPHONE ENCOUNTER
Patient made aware of recommendations, patient verbalized understanding. Script for Nitro sl 0.4mg prn sent to pharmacy.

## 2019-05-28 ENCOUNTER — HOSPITAL ENCOUNTER (OUTPATIENT)
Dept: CARDIOLOGY | Facility: HOSPITAL | Age: 39
Discharge: HOME | End: 2019-05-28

## 2019-05-28 VITALS — HEIGHT: 59 IN | WEIGHT: 216.93 LBS | BODY MASS INDEX: 43.73 KG/M2

## 2019-05-28 DIAGNOSIS — R94.39 ABNORMAL NUCLEAR STRESS TEST: ICD-10-CM

## 2019-05-28 DIAGNOSIS — I10 ESSENTIAL HYPERTENSION: ICD-10-CM

## 2019-05-28 DIAGNOSIS — E78.00 HYPERCHOLESTEREMIA: ICD-10-CM

## 2019-05-28 DIAGNOSIS — R07.89 CHEST PAIN, ATYPICAL: ICD-10-CM

## 2019-05-28 DIAGNOSIS — R60.0 BILATERAL LEG EDEMA: ICD-10-CM

## 2019-05-28 DIAGNOSIS — R00.0 TACHYCARDIA: ICD-10-CM

## 2019-05-28 DIAGNOSIS — R06.02 SHORTNESS OF BREATH: ICD-10-CM

## 2019-05-28 LAB
BH CV ECHO MEAS - ACS: 1.4 CM
BH CV ECHO MEAS - AO MAX PG: 5.8 MMHG
BH CV ECHO MEAS - AO MEAN PG: 2.8 MMHG
BH CV ECHO MEAS - AO ROOT AREA (BSA CORRECTED): 1.4
BH CV ECHO MEAS - AO ROOT AREA: 5.3 CM^2
BH CV ECHO MEAS - AO ROOT DIAM: 2.6 CM
BH CV ECHO MEAS - AO V2 MAX: 120.1 CM/SEC
BH CV ECHO MEAS - AO V2 MEAN: 75.6 CM/SEC
BH CV ECHO MEAS - AO V2 VTI: 22 CM
BH CV ECHO MEAS - BSA(HAYCOCK): 2.1 M^2
BH CV ECHO MEAS - BSA: 1.9 M^2
BH CV ECHO MEAS - BZI_BMI: 43.8 KILOGRAMS/M^2
BH CV ECHO MEAS - BZI_METRIC_HEIGHT: 149.9 CM
BH CV ECHO MEAS - BZI_METRIC_WEIGHT: 98.4 KG
BH CV ECHO MEAS - EDV(CUBED): 67.2 ML
BH CV ECHO MEAS - EDV(TEICH): 72.8 ML
BH CV ECHO MEAS - EF(CUBED): 71.7 %
BH CV ECHO MEAS - EF(TEICH): 63.9 %
BH CV ECHO MEAS - ESV(CUBED): 19.1 ML
BH CV ECHO MEAS - ESV(TEICH): 26.3 ML
BH CV ECHO MEAS - FS: 34.3 %
BH CV ECHO MEAS - IVS/LVPW: 0.9
BH CV ECHO MEAS - IVSD: 0.95 CM
BH CV ECHO MEAS - LA DIMENSION: 3.2 CM
BH CV ECHO MEAS - LA/AO: 1.2
BH CV ECHO MEAS - LAT PEAK E' VEL: 9 CM/SEC
BH CV ECHO MEAS - LV IVRT: 0.1 SEC
BH CV ECHO MEAS - LV MASS(C)D: 131.3 GRAMS
BH CV ECHO MEAS - LV MASS(C)DI: 68.8 GRAMS/M^2
BH CV ECHO MEAS - LVIDD: 4.1 CM
BH CV ECHO MEAS - LVIDS: 2.7 CM
BH CV ECHO MEAS - LVPWD: 1.1 CM
BH CV ECHO MEAS - MED PEAK E' VEL: 8 CM/SEC
BH CV ECHO MEAS - MITRAL HR: 184.5 BPM
BH CV ECHO MEAS - MITRAL R-R: 0.33 SEC
BH CV ECHO MEAS - MV A MAX VEL: 51 CM/SEC
BH CV ECHO MEAS - MV DEC SLOPE: 425.3 CM/SEC^2
BH CV ECHO MEAS - MV DEC TIME: 0.17 SEC
BH CV ECHO MEAS - MV E MAX VEL: 72 CM/SEC
BH CV ECHO MEAS - MV E/A: 1.4
BH CV ECHO MEAS - MV MAX PG: 3.1 MMHG
BH CV ECHO MEAS - MV MEAN PG: 1.3 MMHG
BH CV ECHO MEAS - MV V2 MAX: 88.4 CM/SEC
BH CV ECHO MEAS - MV V2 MEAN: 53.3 CM/SEC
BH CV ECHO MEAS - MV V2 VTI: 17.3 CM
BH CV ECHO MEAS - PA MAX PG: 4.2 MMHG
BH CV ECHO MEAS - PA MEAN PG: 2.2 MMHG
BH CV ECHO MEAS - PA V2 MAX: 102.2 CM/SEC
BH CV ECHO MEAS - PA V2 MEAN: 69.7 CM/SEC
BH CV ECHO MEAS - PA V2 VTI: 23.4 CM
BH CV ECHO MEAS - PULM. HR: 178.8 BPM
BH CV ECHO MEAS - PULM. R-R: 0.34 SEC
BH CV ECHO MEAS - RAP SYSTOLE: 10 MMHG
BH CV ECHO MEAS - RVDD: 2.9 CM
BH CV ECHO MEAS - SI(AO): 60.5 ML/M^2
BH CV ECHO MEAS - SI(CUBED): 25.2 ML/M^2
BH CV ECHO MEAS - SI(TEICH): 24.3 ML/M^2
BH CV ECHO MEAS - SV(AO): 115.4 ML
BH CV ECHO MEAS - SV(CUBED): 48.2 ML
BH CV ECHO MEAS - SV(TEICH): 46.5 ML
BH CV ECHO MEASUREMENTS AVERAGE E/E' RATIO: 8.47
BH CV STRESS RECOVERY BP: NORMAL MMHG
BH CV STRESS RECOVERY HR: 103 BPM
LV EF NUC BP: 75 %
MAXIMAL PREDICTED HEART RATE: 182 BPM
MAXIMAL PREDICTED HEART RATE: 182 BPM
PERCENT MAX PREDICTED HR: 73.63 %
STRESS BASELINE BP: NORMAL MMHG
STRESS BASELINE HR: 91 BPM
STRESS PERCENT HR: 87 %
STRESS POST ESTIMATED WORKLOAD: 7 METS
STRESS POST EXERCISE DUR MIN: 5 MIN
STRESS POST EXERCISE DUR SEC: 53 SEC
STRESS POST PEAK BP: NORMAL MMHG
STRESS POST PEAK HR: 134 BPM
STRESS TARGET HR: 155 BPM
STRESS TARGET HR: 155 BPM

## 2019-05-28 PROCEDURE — 93017 CV STRESS TEST TRACING ONLY: CPT

## 2019-05-28 PROCEDURE — 0 TECHNETIUM SESTAMIBI: Performed by: INTERNAL MEDICINE

## 2019-05-28 PROCEDURE — 93306 TTE W/DOPPLER COMPLETE: CPT | Performed by: INTERNAL MEDICINE

## 2019-05-28 PROCEDURE — A9500 TC99M SESTAMIBI: HCPCS | Performed by: INTERNAL MEDICINE

## 2019-05-28 PROCEDURE — 93018 CV STRESS TEST I&R ONLY: CPT | Performed by: INTERNAL MEDICINE

## 2019-05-28 PROCEDURE — 78452 HT MUSCLE IMAGE SPECT MULT: CPT | Performed by: INTERNAL MEDICINE

## 2019-05-28 PROCEDURE — 93306 TTE W/DOPPLER COMPLETE: CPT

## 2019-05-28 PROCEDURE — 78452 HT MUSCLE IMAGE SPECT MULT: CPT

## 2019-05-28 RX ADMIN — TECHNETIUM TC 99M SESTAMIBI 1 DOSE: 1 INJECTION INTRAVENOUS at 13:50

## 2019-05-28 RX ADMIN — TECHNETIUM TC 99M SESTAMIBI 1 DOSE: 1 INJECTION INTRAVENOUS at 13:51

## 2019-06-13 RX ORDER — NITROGLYCERIN 0.4 MG/1
TABLET SUBLINGUAL
Qty: 25 TABLET | Refills: 0 | OUTPATIENT
Start: 2019-06-13

## 2019-07-22 RX ORDER — LISINOPRIL 5 MG/1
TABLET ORAL
Qty: 90 TABLET | Refills: 3 | OUTPATIENT
Start: 2019-07-22

## 2020-03-19 ENCOUNTER — TELEPHONE (OUTPATIENT)
Dept: CARDIOLOGY | Facility: CLINIC | Age: 40
End: 2020-03-19

## 2020-03-19 RX ORDER — METOPROLOL TARTRATE 50 MG/1
75 TABLET, FILM COATED ORAL 2 TIMES DAILY
Qty: 45 TABLET | Refills: 1 | Status: SHIPPED | OUTPATIENT
Start: 2020-03-19 | End: 2020-04-08

## 2020-03-19 NOTE — TELEPHONE ENCOUNTER
"Patient called stating \"my B/P and HR has been elevated for the last week, B/P 110/90 120/90 138/98 with -160\".  She has had no shortness of breath, has had some pressure in chest today when she gets up out of bed, no nausea, and reports feels ok when she is laying down. Has been out of Metoprolol tartrate 50mg since yesterday.    Atorvastatin 10mg daily  Metoprolol tartrate 50mg bid  Bumex 1mg daily prn for swelling    "

## 2020-03-19 NOTE — TELEPHONE ENCOUNTER
Patient made aware of recommendations to increase Metoprolol tartrate 75mg bid, drink plenty of fluids to prevent systolic from decreasing, monitor B/P twice daily for one week and report, patient verbalized understanding.

## 2020-03-19 NOTE — TELEPHONE ENCOUNTER
Restart metoprolol and take and extra 1/2 tablet in morning and evening - metoprolol 75 mg BID. Drink plenty of fluids to prevent systolic drop.     After restarting med, keep bp log twice daily for 1 week and report

## 2020-03-27 RX ORDER — METOPROLOL TARTRATE 50 MG/1
TABLET, FILM COATED ORAL
Qty: 45 TABLET | Refills: 1 | OUTPATIENT
Start: 2020-03-27

## 2020-04-08 RX ORDER — METOPROLOL TARTRATE 50 MG/1
75 TABLET, FILM COATED ORAL 2 TIMES DAILY
Qty: 45 TABLET | Refills: 0 | Status: SHIPPED | OUTPATIENT
Start: 2020-04-08 | End: 2020-05-05

## 2020-05-05 RX ORDER — METOPROLOL TARTRATE 50 MG/1
75 TABLET, FILM COATED ORAL 2 TIMES DAILY
Qty: 45 TABLET | Refills: 0 | Status: SHIPPED | OUTPATIENT
Start: 2020-05-05 | End: 2020-06-01 | Stop reason: SDUPTHER

## 2020-05-18 ENCOUNTER — TELEPHONE (OUTPATIENT)
Dept: CARDIOLOGY | Facility: CLINIC | Age: 40
End: 2020-05-18

## 2020-05-18 DIAGNOSIS — R94.39 ABNORMAL NUCLEAR STRESS TEST: Primary | ICD-10-CM

## 2020-05-18 DIAGNOSIS — R06.02 SHORTNESS OF BREATH: ICD-10-CM

## 2020-05-18 NOTE — TELEPHONE ENCOUNTER
"Patient called stating \"I saw my PCP on Friday due to shortness of breath just walking down [address] a work, started having chest pain and sweating. I saw Dr Clemons and had CTA of chest today, PCP wanted me to call Dr Alfred and see if I needed to be seen or what he wanted done\". B/P 140/90 and HR 90 at rest.    Medications  Bumex 2mg daily, she has been only taking QOD or every 2 days due to work.  Aspirin 81mg daily  Atorvastatin 10mg daily  Lopressor 75mg bid  Nitro sl 0.4mg prn      "

## 2020-05-18 NOTE — TELEPHONE ENCOUNTER
Patient made aware if CTA is normal will need right and left heart cath, patient verbalized understanding and to call office back when she gets results of CTA of chest.

## 2020-05-29 ENCOUNTER — OUTSIDE FACILITY SERVICE (OUTPATIENT)
Dept: CARDIOLOGY | Facility: CLINIC | Age: 40
End: 2020-05-29

## 2020-05-29 PROCEDURE — 93460 R&L HRT ART/VENTRICLE ANGIO: CPT | Performed by: INTERNAL MEDICINE

## 2020-06-01 RX ORDER — METOPROLOL TARTRATE 50 MG/1
75 TABLET, FILM COATED ORAL 2 TIMES DAILY
Qty: 270 TABLET | Refills: 2 | Status: CANCELLED | OUTPATIENT
Start: 2020-06-01

## 2020-06-01 RX ORDER — METOPROLOL TARTRATE 50 MG/1
75 TABLET, FILM COATED ORAL 3 TIMES DAILY
Qty: 135 TABLET | Refills: 11 | Status: SHIPPED | OUTPATIENT
Start: 2020-06-01 | End: 2020-06-09 | Stop reason: ALTCHOICE

## 2020-06-01 NOTE — TELEPHONE ENCOUNTER
Patient aware she can return to work.  She requests a statement be faxed to [phone], [facility], ATTN: Whitney Robison.  Statement faxed.    I also advised patient Lopressor script will be sent to [facility].

## 2020-06-01 NOTE — TELEPHONE ENCOUNTER
Pt called asking for refills on Lopressor 75 mg TID. She is asking if she can go back to work tomorrow instead of being off this week. She is fine with a lifting restriction if you feel necessary.

## 2020-06-09 ENCOUNTER — TELEPHONE (OUTPATIENT)
Dept: CARDIOLOGY | Facility: CLINIC | Age: 40
End: 2020-06-09

## 2020-06-09 RX ORDER — LABETALOL 200 MG/1
200 TABLET, FILM COATED ORAL 2 TIMES DAILY
Qty: 180 TABLET | Refills: 3 | Status: SHIPPED | OUTPATIENT
Start: 2020-06-09 | End: 2020-06-10

## 2020-06-10 ENCOUNTER — TELEPHONE (OUTPATIENT)
Dept: CARDIOLOGY | Facility: CLINIC | Age: 40
End: 2020-06-10

## 2020-06-10 NOTE — TELEPHONE ENCOUNTER
Pt lm. After taking one dose of the labetalol last night she woke with facial numbness, numbness in her extremities and shaking all over.  Reports feeling better than she did but doesn't want to take any more of the labetalol. Went back on the Lopressor today till she hears from us.

## 2020-06-25 ENCOUNTER — TELEPHONE (OUTPATIENT)
Dept: CARDIOLOGY | Facility: CLINIC | Age: 40
End: 2020-06-25

## 2020-07-09 ENCOUNTER — OUTSIDE FACILITY SERVICE (OUTPATIENT)
Dept: CARDIOLOGY | Facility: CLINIC | Age: 40
End: 2020-07-09

## 2020-07-09 PROCEDURE — 99244 OFF/OP CNSLTJ NEW/EST MOD 40: CPT | Performed by: INTERNAL MEDICINE

## 2020-07-13 ENCOUNTER — OUTSIDE FACILITY SERVICE (OUTPATIENT)
Dept: CARDIOLOGY | Facility: CLINIC | Age: 40
End: 2020-07-13

## 2020-07-13 PROCEDURE — 99232 SBSQ HOSP IP/OBS MODERATE 35: CPT | Performed by: INTERNAL MEDICINE

## 2020-07-14 ENCOUNTER — OUTSIDE FACILITY SERVICE (OUTPATIENT)
Dept: CARDIOLOGY | Facility: CLINIC | Age: 40
End: 2020-07-14

## 2020-07-14 PROCEDURE — 99233 SBSQ HOSP IP/OBS HIGH 50: CPT | Performed by: INTERNAL MEDICINE

## 2020-07-15 ENCOUNTER — OUTSIDE FACILITY SERVICE (OUTPATIENT)
Dept: CARDIOLOGY | Facility: CLINIC | Age: 40
End: 2020-07-15

## 2020-07-15 PROCEDURE — 99233 SBSQ HOSP IP/OBS HIGH 50: CPT | Performed by: INTERNAL MEDICINE

## 2020-07-16 ENCOUNTER — OUTSIDE FACILITY SERVICE (OUTPATIENT)
Dept: CARDIOLOGY | Facility: CLINIC | Age: 40
End: 2020-07-16

## 2020-07-16 PROCEDURE — 99232 SBSQ HOSP IP/OBS MODERATE 35: CPT | Performed by: INTERNAL MEDICINE

## 2020-07-17 ENCOUNTER — TELEPHONE (OUTPATIENT)
Dept: CARDIOLOGY | Facility: CLINIC | Age: 40
End: 2020-07-17

## 2020-07-17 RX ORDER — METOPROLOL TARTRATE 50 MG/1
TABLET, FILM COATED ORAL
Qty: 270 TABLET | Refills: 3 | Status: SHIPPED | OUTPATIENT
Start: 2020-07-17 | End: 2020-09-11 | Stop reason: DRUGHIGH

## 2020-07-17 NOTE — TELEPHONE ENCOUNTER
Pt aware to increase Lopressor to 75 mg BID. Pt reports red area below PPM site that is warm to touch.  Advised to call Dr Alonso's office.  Do you want her to keep appointment to see you on Monday?

## 2020-07-17 NOTE — TELEPHONE ENCOUNTER
"Pt called, was discharged last night. Called to report vitals.  Before meds:    Layin/80 pulse 92  Sittin/73 pulse 110  Standin/64 pulse 130    After meds, rechecked due to headache  146/92 pulse 76  \"pulse goes up to 120-130 with any exertion\"    Current meds include:  Lopressor 50 mg BID  Midodrine 10 mg TID    1) do you want to make any med changes/  2) Pt has appointment to see you on Monday ( previously scheduled), do you want her to keep this appointment   "

## 2020-07-20 NOTE — TELEPHONE ENCOUNTER
Left message for pt that we were cancelling her appointment for today and that I will call back with appointment.

## 2020-08-13 ENCOUNTER — TELEPHONE (OUTPATIENT)
Dept: CARDIOLOGY | Facility: CLINIC | Age: 40
End: 2020-08-13

## 2020-08-13 RX ORDER — DILTIAZEM HYDROCHLORIDE 180 MG/1
180 CAPSULE, COATED, EXTENDED RELEASE ORAL DAILY
Qty: 90 CAPSULE | Refills: 3 | Status: SHIPPED | OUTPATIENT
Start: 2020-08-13 | End: 2020-09-11

## 2020-08-13 NOTE — TELEPHONE ENCOUNTER
AMANDO for pt. Message sent through My Chart. Script sent to [facility].

## 2020-08-13 NOTE — TELEPHONE ENCOUNTER
Pt called, heart rate has been as high as 130's with very minimal exertion. Has been taking an extra dose of Metoprolol 75 mg but has not helped much at all.  BP up to 170/100. Has decreased the midodrine 10 mg daily.     Takin) metoprolol 75 mg BID, has been taking TID but still not bringing down pulse  2) midodrine 10 gm TID, has decreased to daily  3) fludrocortisone 0.1 mg daily  4) Xarelto 20 mg daily.

## 2020-09-11 ENCOUNTER — TELEPHONE (OUTPATIENT)
Dept: CARDIOLOGY | Facility: CLINIC | Age: 40
End: 2020-09-11

## 2020-09-11 ENCOUNTER — OFFICE VISIT (OUTPATIENT)
Dept: CARDIOLOGY | Facility: CLINIC | Age: 40
End: 2020-09-11

## 2020-09-11 VITALS
DIASTOLIC BLOOD PRESSURE: 60 MMHG | HEART RATE: 74 BPM | BODY MASS INDEX: 41.53 KG/M2 | WEIGHT: 206 LBS | SYSTOLIC BLOOD PRESSURE: 110 MMHG | HEIGHT: 59 IN | TEMPERATURE: 97.5 F

## 2020-09-11 DIAGNOSIS — E66.01 MORBID OBESITY WITH BMI OF 40.0-44.9, ADULT (HCC): ICD-10-CM

## 2020-09-11 DIAGNOSIS — E88.81 METABOLIC SYNDROME: ICD-10-CM

## 2020-09-11 DIAGNOSIS — E78.00 HYPERCHOLESTEREMIA: ICD-10-CM

## 2020-09-11 DIAGNOSIS — Q24.5 CORONARY-MYOCARDIAL BRIDGE: ICD-10-CM

## 2020-09-11 DIAGNOSIS — I10 ESSENTIAL HYPERTENSION: ICD-10-CM

## 2020-09-11 DIAGNOSIS — Z95.0 PRESENCE OF CARDIAC PACEMAKER: ICD-10-CM

## 2020-09-11 DIAGNOSIS — G47.33 OSA (OBSTRUCTIVE SLEEP APNEA): ICD-10-CM

## 2020-09-11 DIAGNOSIS — Z86.718 HISTORY OF ARTERIAL THROMBOSIS: ICD-10-CM

## 2020-09-11 DIAGNOSIS — I49.5 TACHY-BRADY SYNDROME (HCC): ICD-10-CM

## 2020-09-11 PROCEDURE — 93000 ELECTROCARDIOGRAM COMPLETE: CPT | Performed by: NURSE PRACTITIONER

## 2020-09-11 PROCEDURE — 99214 OFFICE O/P EST MOD 30 MIN: CPT | Performed by: NURSE PRACTITIONER

## 2020-09-11 RX ORDER — TIZANIDINE 4 MG/1
4 TABLET ORAL 2 TIMES DAILY PRN
COMMUNITY

## 2020-09-11 RX ORDER — CLOPIDOGREL BISULFATE 75 MG/1
75 TABLET ORAL DAILY
Qty: 90 TABLET | Refills: 3 | Status: SHIPPED | OUTPATIENT
Start: 2020-09-11 | End: 2021-03-11 | Stop reason: SDUPTHER

## 2020-09-11 RX ORDER — DILTIAZEM HYDROCHLORIDE EXTENDED-RELEASE TABLETS 240 MG/1
240 TABLET, EXTENDED RELEASE ORAL DAILY
Qty: 30 TABLET | Refills: 11 | Status: SHIPPED | OUTPATIENT
Start: 2020-09-11 | End: 2020-09-14

## 2020-09-11 RX ORDER — ZOLPIDEM TARTRATE 10 MG/1
10 TABLET ORAL NIGHTLY PRN
COMMUNITY

## 2020-09-11 NOTE — TELEPHONE ENCOUNTER
Patient reported she thought she was suppose to be on Plavix after recent cath. She is now off Xarelto, thrombus resolved. 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 Had recent placement of pacemaker. I advised L-arginine and aspirin. Do you recommend Plavix also?   Thank you

## 2020-09-11 NOTE — PROGRESS NOTES
"Chief Complaint   Patient presents with   • Hospital Follow Up Visit     Post cardiac cath .  had previously  been on Xarelto and has been discontinued 8- per [place] [facility] at  , clot verified dissolved per Ct scan. Had Appendectomy  prior to having pacemaker placed.   • Pacemaker Check     St Tyrell device  placed July 2020 per Dr. Marinelli  . Has not been  checked since placed.    • Palpitations     has episodes of \" flutter \" in chest and has SOA, she feel it is usuallly when pacemaker kicks in.   • Dizziness     Has some dizziness, she reports is better.   • Med Refill     Needs refills on Metoprolol 75 mg  90 day supply to [facility] pharmacy       Subjective       Miriam Power is a 39 y.o. female with HTN, depression, recurrent headaches with associated partial complex seizure who was referred for her initial cardiac evaluation in July 2018 secondary to weight gain, chest tightness, shortness of breath, and edema. Cardiac work-up with stress test and echocardiogram with bubble on 7/12/2018 showed normal LVEF, no shunt, normal cardiac size, no diastolic dysfunction.  Stress test showed moderate impairment of exercise tolerance, hypertensive blood pressure response with peak /69, and questionable changes in the anterior wall felt to be related to breast attenuation but could not rule out ischemia.  Lisinopril 5 mg daily added with plan for cardiac cath if she continued to have symptoms.  We did not see her after that.  She then called the office on 5/9/2019 reporting worsening shortness of breath, edema, and chest tightness. She had seen Dr. Vasquez and Lisinopril changed to Metoprolol. Cardiac cath was ordered but not completed.     Today she returns to the office for a hospital follow up visit. In May 2020 she had more chest pain and shortness of breath. CTA of lungs showed mild bibasilar atelectasis. No PE. She then underwent cardiac cath that showed normal coronaries with mild myocardial bridging " noted, possible endothelial dysfunction, and low to normal PA pressure. She developed right lower extremity arterial clot managed at  vascular clinic. According to patient recent scan showed clot resolved and Xarelto discontinued. On July 5th she went to ER with lower right abdominal pain then underwent appendectomy. A few days later she developed shortness of breath. Home monitor showed low heart rate. She went to hospital and admitted. Tachy-horacio syndrome diagnosed. On 7/15/20 she underwent dual chamber St. Tyrell pacemaker per Dr. Alonso. Currently, symptoms are improved. She has been exercising 3 times a week. She has stopped several medications and managing diet better with some weight loss. She wants to try diet and exercise with weight loss surgery as last option.        Cardiac History:    Past Surgical History:   Procedure Laterality Date   • BREAST IMPLANT SURGERY     • CARDIAC CATHETERIZATION  05/29/2020    Normal Coronaries, Normal EF. Low PA pressure   • CARDIOVASCULAR STRESS TEST  07/12/2018    5 Min, 11 secs, 7.0 METS. 83% THR. BP- 190/69. R/O Anterior Ischemia.   • CARDIOVASCULAR STRESS TEST  05/28/2019    5 Min, 53 Secs. 7.00 METS. 73% THR. BP- 142/55. EF > 70%. Breast attenuation/   • ECHO - CONVERTED  11/15/2015    @[facility]. - EF 60%   • ECHO - CONVERTED  07/12/2018    EF 65%. No shunt   • ECHO - CONVERTED  05/28/2019    [facility]. EF 65%. Mild MR.   • TUBAL ABDOMINAL LIGATION         Current Outpatient Medications   Medication Sig Dispense Refill   • albuterol sulfate  (90 Base) MCG/ACT inhaler Inhale 2 puffs Every 6 (Six) Hours As Needed for Wheezing.     • aspirin 81 MG tablet Take 1 tablet by mouth Daily. 30 tablet 11   • atorvastatin (LIPITOR) 10 MG tablet Take 1 tablet by mouth Daily. 90 tablet 3   • buPROPion SR (WELLBUTRIN SR) 150 MG 12 hr tablet TAKE 1 TABLET BY MOUTH TWICE A DAY  3   • metFORMIN (GLUCOPHAGE) 500 MG tablet Take 500 mg by mouth Daily.     • ondansetron (ZOFRAN)  4 MG tablet Take 4 mg by mouth Every 8 (Eight) Hours As Needed for Nausea or Vomiting.     • promethazine (PHENERGAN) 25 MG tablet Take 25 mg by mouth Every 6 (Six) Hours As Needed for Nausea or Vomiting.     • rizatriptan (MAXALT) 10 MG tablet TAKE 1 TAB AS NEEDED FOR HEADACHE. MAY REPEAT 1 TAB WITHIN 2 HOURS. MAX 2 TABS/24HRS. NO OTHER TRIPT  5   • tiZANidine (ZANAFLEX) 4 MG tablet Take 4 mg by mouth 2 (Two) Times a Day As Needed for Muscle Spasms.     • topiramate (TOPAMAX) 200 MG tablet Take 200 mg by mouth 2 (Two) Times a Day.     • zolpidem (AMBIEN) 10 MG tablet Take 10 mg by mouth At Night As Needed for Sleep.     • Arginine 1000 MG tablet As directed     • dilTIAZem LA (Cardizem LA) 240 MG 24 hr tablet Take 1 tablet by mouth Daily. 30 tablet 11   • metoprolol tartrate (LOPRESSOR) 25 MG tablet Take 1 tablet by mouth 2 (Two) Times a Day. 60 tablet 11   • nitroglycerin (NITROSTAT) 0.4 MG SL tablet Place 1 tablet under the tongue Every 5 (Five) Minutes As Needed for Chest Pain. Take no more than 3 doses in 15 minutes. 25 tablet 1     No current facility-administered medications for this visit.        Contrast dye and Labetalol    Past Medical History:   Diagnosis Date   • Frequent headaches    • History of suicide attempt    • History of tubal ligation    • Hypertension    • Hyperthyroidism    • Panic attack    • Seizures (CMS/HCC)    • Trigeminal neuralgia    • Vitamin D deficiency        Social History     Socioeconomic History   • Marital status:      Spouse name: Not on file   • Number of children: Not on file   • Years of education: Not on file   • Highest education level: Not on file   Tobacco Use   • Smoking status: Never Smoker   • Smokeless tobacco: Never Used   Substance and Sexual Activity   • Alcohol use: No   • Drug use: No   • Sexual activity: Defer       Family History   Problem Relation Age of Onset   • Hyperlipidemia Mother    • No Known Problems Father    • No Known Problems Brother       "      Review of Systems   Constitution: Negative for decreased appetite, diaphoresis and malaise/fatigue.   HENT: Negative for nosebleeds.    Eyes: Negative for blurred vision.   Cardiovascular: Positive for palpitations (very slight). Negative for chest pain, claudication, cyanosis, dyspnea on exertion, irregular heartbeat, leg swelling, near-syncope, orthopnea, paroxysmal nocturnal dyspnea and syncope.   Respiratory: Negative for shortness of breath (with exertion, overall improved) and snoring.    Endocrine: Negative for cold intolerance and heat intolerance.   Hematologic/Lymphatic: Does not bruise/bleed easily.   Skin: Negative for rash.   Musculoskeletal: Positive for back pain (improved after back surgery). Negative for myalgias.   Gastrointestinal: Negative for heartburn, melena and nausea.   Genitourinary: Negative for dysuria and hematuria.   Neurological: Negative for dizziness and light-headedness.   Psychiatric/Behavioral: The patient does not have insomnia and is not nervous/anxious.         Objective      Labs 6/17/2020: Glucose 109, BUN 29, creatinine 1.5, sodium 137, potassium 3.4, chloride 105, CO2 22, calcium 8.9, total protein 7.9, albumin 4.1, total bili 0.2, ALP 94, ALT 61, AST 37, GFR 39 RBC 4.94, hemoglobin 13.8, hematocrit 41.4, platelets 345    /60 (BP Location: Left arm)   Pulse 74   Temp 97.5 °F (36.4 °C)   Ht 149.9 cm (59\")   Wt 93.4 kg (206 lb)   BMI 41.61 kg/m²     Physical Exam   Constitutional: She is oriented to person, place, and time. She appears well-nourished.   HENT:   Head: Normocephalic.   Eyes: Pupils are equal, round, and reactive to light. Conjunctivae are normal.   Neck: Normal range of motion. Neck supple. Carotid bruit is not present.   Cardiovascular: Normal rate, regular rhythm, S1 normal and S2 normal.   No murmur heard.  Pulses:       Radial pulses are 2+ on the right side, and 2+ on the left side.   Pacemaker insertion site healing well "   Pulmonary/Chest: Breath sounds normal.   Abdominal: Soft. Bowel sounds are normal.   Musculoskeletal: Normal range of motion. She exhibits no edema.   Neurological: She is alert and oriented to person, place, and time.   Skin: Skin is warm and dry.   Psychiatric: She has a normal mood and affect. Her behavior is normal.          ECG 12 Lead  Date/Time: 9/11/2020 12:06 PM  Performed by: Mariela Garcia APRN  Authorized by: Mariela Garcia APRN   Comparison: compared with previous ECG from 6/17/2020  Similar to previous ECG  Rhythm: sinus rhythm  BPM: 74                Problem List Items Addressed This Visit        Cardiovascular and Mediastinum    Essential hypertension    Relevant Medications    dilTIAZem LA (Cardizem LA) 240 MG 24 hr tablet    metoprolol tartrate (LOPRESSOR) 25 MG tablet    Tachy-horacio syndrome (CMS/HCC)    Relevant Medications    dilTIAZem LA (Cardizem LA) 240 MG 24 hr tablet    metoprolol tartrate (LOPRESSOR) 25 MG tablet    Hypercholesteremia    Coronary-myocardial bridge    Relevant Medications    dilTIAZem LA (Cardizem LA) 240 MG 24 hr tablet    metoprolol tartrate (LOPRESSOR) 25 MG tablet    Presence of cardiac pacemaker       Other    Metabolic syndrome    History of arterial thrombosis      Other Visit Diagnoses     LEXX (obstructive sleep apnea)        Morbid obesity with BMI of 40.0-44.9, adult (CMS/[facility])               Report of recent heart cath reviewed. Coronaries small but normal. Mild area of bridging noted. Endothelial dysfunction suspected. Advised to continue L-arginine. Continue aspirin.     EKG today shows sinus rhythm. A pacemaker interrogation scheduled for October. To manage tachycardia she is currently on beta blocker and CCB. Due to pulmonary issues patient request stopping beta blocker to help manage shortness of breath. She also reports tachycardia in afternoons a few hours prior to next dose beta blocker. BP has been normal to increased according to patient. We will  change medications with Lopressor decreased to 25 mg bid and Cardizem increased to 240 mg daily. She will continue to monitor vital signs and call for any issues.     Continue Lipitor. She will follow with PCP for lab orders/management. Please forward copy of next lab results.     Patient's Body mass index is 41.61 kg/m². BMI is above normal parameters. Recommendations include: nutrition counseling. Currently she is on diet for weight loss. Weight is down 10 pounds from last visit. I encouraged her diet and weight loss efforts.      Thrombus resolved per vascular clinic. Continue aspirin therapy alone.     Miriam Power  reports that she has never smoked. She has never used smokeless tobacco.     A 6 month follow up visit scheduled. Please call sooner for cardiac concerns.              Electronically signed by CARLOS Garcia,  September 11, 2020 12:17

## 2020-09-14 RX ORDER — DILTIAZEM HYDROCHLORIDE 240 MG/1
240 CAPSULE, COATED, EXTENDED RELEASE ORAL DAILY
Qty: 30 CAPSULE | Refills: 11 | Status: SHIPPED | OUTPATIENT
Start: 2020-09-14 | End: 2020-09-17 | Stop reason: ALTCHOICE

## 2020-09-16 ENCOUNTER — TELEPHONE (OUTPATIENT)
Dept: CARDIOLOGY | Facility: CLINIC | Age: 40
End: 2020-09-16

## 2020-09-16 NOTE — TELEPHONE ENCOUNTER
Received request from Pharmacy reporting that Diltiazem  24 H  mg  is not covered by insurance.  Alternative that is covered is Diltiazem XR    Is it ok to change or try to get prescribe med approved?  If changed, what dose/directions please

## 2020-09-17 RX ORDER — DILTIAZEM HYDROCHLORIDE 240 MG/1
240 CAPSULE, EXTENDED RELEASE ORAL DAILY
Qty: 30 CAPSULE | Refills: 6 | Status: SHIPPED | OUTPATIENT
Start: 2020-09-17 | End: 2020-12-07 | Stop reason: SDUPTHER

## 2020-09-17 NOTE — TELEPHONE ENCOUNTER
Diltiazem ER not covered by insurance. Per SILVANO Sierra to change to Diltiazem  mg daily. Script sent to [facility]

## 2020-10-21 ENCOUNTER — OFFICE VISIT (OUTPATIENT)
Dept: CARDIOLOGY | Facility: CLINIC | Age: 40
End: 2020-10-21

## 2020-10-21 DIAGNOSIS — I49.5 TACHY-BRADY SYNDROME (HCC): Primary | ICD-10-CM

## 2020-10-21 DIAGNOSIS — R00.2 PALPITATIONS: ICD-10-CM

## 2020-10-21 PROCEDURE — 93288 INTERROG EVL PM/LDLS PM IP: CPT | Performed by: INTERNAL MEDICINE

## 2020-10-22 ENCOUNTER — TELEPHONE (OUTPATIENT)
Dept: CARDIOLOGY | Facility: CLINIC | Age: 40
End: 2020-10-22

## 2020-10-22 DIAGNOSIS — E88.81 METABOLIC SYNDROME: ICD-10-CM

## 2020-10-22 DIAGNOSIS — E66.01 MORBID OBESITY WITH BMI OF 40.0-44.9, ADULT (HCC): Primary | ICD-10-CM

## 2020-10-22 DIAGNOSIS — I10 ESSENTIAL HYPERTENSION: ICD-10-CM

## 2020-10-22 DIAGNOSIS — G47.33 OSA (OBSTRUCTIVE SLEEP APNEA): ICD-10-CM

## 2020-10-22 NOTE — TELEPHONE ENCOUNTER
----- Message from Rangel Alfred MD sent at 10/21/2020 12:02 PM EDT -----  Regarding: FW: Referral Request  Contact: [phone]      ----- Message -----  From: Arabella Myers LPN  Sent: 10/19/2020   5:33 PM EDT  To: Rangel Alfred MD  Subject: FW: Referral Request                             Patient is wanting a referral.  ----- Message -----  From: Miriam Power  Sent: 10/16/2020   1:20 PM EDT  To: [facility] [place]  Subject: RE: Referral Request                             Arabella, thanks i would very much appreciate it. I have called and left a message to get an appt but haven't heard anything yet. Thanks so much!     ----- Message -----  From: DENNIS GROVER  Sent: 10/16/20 12:47  To: Miriam Powre  Subject: RE: Referral Request    Dr Tima Mederos feels Dr Wharton is a good physician and he can refer you if that is what you wish. Thanks Arabella       ----- Message -----       From:Miriam Power       Sent:10/15/2020 11:25 AM EDT         To:Rangel Alfred MD    Subject:Referral Request    Lul Isidro,   I was informed  will not longer be performing bariatric surgery here. I was told Dr. Enrique wharton was new in [facility]. Just wondering if you recommend him or should I look into the MD in [place]?? If so, would you mind sending a referral to ? He will be in with .   Thanks so much!

## 2020-10-27 NOTE — TELEPHONE ENCOUNTER
Phoned Dr Cummins office for referral, unable to refer patient from speciality, bariatric patients have to be referred per PCP. Can we cancel referral? Thanks

## 2020-10-28 NOTE — TELEPHONE ENCOUNTER
"Patient made aware that bariatric referrals with Dr Cummins are required by PCP, patient verbalized understanding and states \"I have a friend that actually sees someone from [facility] and I have received paper work that I am currently completing\".   "

## 2020-10-28 NOTE — TELEPHONE ENCOUNTER
Spoke with Hope at PCP office, per Dr Cummins office, referral has to be made per PCP not speciality office.

## 2020-11-24 ENCOUNTER — TELEPHONE (OUTPATIENT)
Dept: CARDIOLOGY | Facility: CLINIC | Age: 40
End: 2020-11-24

## 2020-11-24 NOTE — TELEPHONE ENCOUNTER
Received fax from [facility] for cardiac clearance for patient to have an epidural injection. Patient is on Plavix and they are requesting to hold for 7 days. According to our records, I do not see where patient has had any stenting. Patient had a cath on 05/29/2020 and patient has had pacemaker placed.       Fax [phone]

## 2020-12-09 RX ORDER — DILTIAZEM HYDROCHLORIDE 240 MG/1
240 CAPSULE, EXTENDED RELEASE ORAL DAILY
Qty: 30 CAPSULE | Refills: 6 | Status: SHIPPED | OUTPATIENT
Start: 2020-12-09 | End: 2021-03-11 | Stop reason: SDUPTHER

## 2021-03-11 ENCOUNTER — LAB (OUTPATIENT)
Dept: LAB | Facility: HOSPITAL | Age: 41
End: 2021-03-11

## 2021-03-11 ENCOUNTER — OFFICE VISIT (OUTPATIENT)
Dept: CARDIOLOGY | Facility: CLINIC | Age: 41
End: 2021-03-11

## 2021-03-11 VITALS
TEMPERATURE: 98 F | HEART RATE: 93 BPM | BODY MASS INDEX: 38.1 KG/M2 | SYSTOLIC BLOOD PRESSURE: 136 MMHG | DIASTOLIC BLOOD PRESSURE: 84 MMHG | WEIGHT: 189 LBS | HEIGHT: 59 IN

## 2021-03-11 DIAGNOSIS — I49.5 SSS (SICK SINUS SYNDROME) (HCC): Primary | ICD-10-CM

## 2021-03-11 DIAGNOSIS — I10 ESSENTIAL HYPERTENSION: ICD-10-CM

## 2021-03-11 DIAGNOSIS — R06.02 SHORTNESS OF BREATH: ICD-10-CM

## 2021-03-11 DIAGNOSIS — E78.00 HYPERCHOLESTEREMIA: ICD-10-CM

## 2021-03-11 DIAGNOSIS — R42 DIZZINESS: ICD-10-CM

## 2021-03-11 DIAGNOSIS — R07.89 CHEST PRESSURE: ICD-10-CM

## 2021-03-11 DIAGNOSIS — Z86.718 HISTORY OF ARTERIAL THROMBOSIS: ICD-10-CM

## 2021-03-11 DIAGNOSIS — G47.33 OSA (OBSTRUCTIVE SLEEP APNEA): ICD-10-CM

## 2021-03-11 DIAGNOSIS — R00.2 PALPITATIONS: ICD-10-CM

## 2021-03-11 DIAGNOSIS — Q24.5 CORONARY-MYOCARDIAL BRIDGE: ICD-10-CM

## 2021-03-11 DIAGNOSIS — Z79.899 MEDICATION MANAGEMENT: ICD-10-CM

## 2021-03-11 DIAGNOSIS — Z95.0 PRESENCE OF CARDIAC PACEMAKER: ICD-10-CM

## 2021-03-11 DIAGNOSIS — I49.5 TACHY-BRADY SYNDROME (HCC): ICD-10-CM

## 2021-03-11 LAB
BASOPHILS # BLD AUTO: 0.03 10*3/MM3 (ref 0–0.2)
BASOPHILS NFR BLD AUTO: 0.3 % (ref 0–1.5)
D DIMER PPP FEU-MCNC: 0.35 MCGFEU/ML (ref 0–0.5)
DEPRECATED RDW RBC AUTO: 50 FL (ref 37–54)
EOSINOPHIL # BLD AUTO: 0.1 10*3/MM3 (ref 0–0.4)
EOSINOPHIL NFR BLD AUTO: 1.1 % (ref 0.3–6.2)
ERYTHROCYTE [DISTWIDTH] IN BLOOD BY AUTOMATED COUNT: 15.4 % (ref 12.3–15.4)
ERYTHROCYTE [SEDIMENTATION RATE] IN BLOOD: 8 MM/HR (ref 0–20)
HCT VFR BLD AUTO: 48.4 % (ref 34–46.6)
HGB BLD-MCNC: 15.5 G/DL (ref 12–15.9)
IMM GRANULOCYTES # BLD AUTO: 0.02 10*3/MM3 (ref 0–0.05)
IMM GRANULOCYTES NFR BLD AUTO: 0.2 % (ref 0–0.5)
LYMPHOCYTES # BLD AUTO: 2.79 10*3/MM3 (ref 0.7–3.1)
LYMPHOCYTES NFR BLD AUTO: 31.3 % (ref 19.6–45.3)
MCH RBC QN AUTO: 28.4 PG (ref 26.6–33)
MCHC RBC AUTO-ENTMCNC: 32 G/DL (ref 31.5–35.7)
MCV RBC AUTO: 88.6 FL (ref 79–97)
MONOCYTES # BLD AUTO: 0.53 10*3/MM3 (ref 0.1–0.9)
MONOCYTES NFR BLD AUTO: 5.9 % (ref 5–12)
NEUTROPHILS NFR BLD AUTO: 5.45 10*3/MM3 (ref 1.7–7)
NEUTROPHILS NFR BLD AUTO: 61.2 % (ref 42.7–76)
NRBC BLD AUTO-RTO: 0 /100 WBC (ref 0–0.2)
PLATELET # BLD AUTO: 289 10*3/MM3 (ref 140–450)
PMV BLD AUTO: 10 FL (ref 6–12)
RBC # BLD AUTO: 5.46 10*6/MM3 (ref 3.77–5.28)
TSH SERPL DL<=0.05 MIU/L-ACNC: 1.14 UIU/ML (ref 0.27–4.2)
WBC # BLD AUTO: 8.92 10*3/MM3 (ref 3.4–10.8)

## 2021-03-11 PROCEDURE — 99214 OFFICE O/P EST MOD 30 MIN: CPT | Performed by: NURSE PRACTITIONER

## 2021-03-11 PROCEDURE — 84443 ASSAY THYROID STIM HORMONE: CPT

## 2021-03-11 PROCEDURE — 85652 RBC SED RATE AUTOMATED: CPT

## 2021-03-11 PROCEDURE — 85025 COMPLETE CBC W/AUTO DIFF WBC: CPT

## 2021-03-11 PROCEDURE — 85379 FIBRIN DEGRADATION QUANT: CPT

## 2021-03-11 PROCEDURE — 36415 COLL VENOUS BLD VENIPUNCTURE: CPT

## 2021-03-11 PROCEDURE — 93000 ELECTROCARDIOGRAM COMPLETE: CPT | Performed by: NURSE PRACTITIONER

## 2021-03-11 RX ORDER — CLOPIDOGREL BISULFATE 75 MG/1
75 TABLET ORAL DAILY
Qty: 90 TABLET | Refills: 3 | Status: SHIPPED | OUTPATIENT
Start: 2021-03-11 | End: 2022-06-06

## 2021-03-11 RX ORDER — DILTIAZEM HYDROCHLORIDE 240 MG/1
240 CAPSULE, EXTENDED RELEASE ORAL DAILY
Qty: 90 CAPSULE | Refills: 3 | Status: SHIPPED | OUTPATIENT
Start: 2021-03-11 | End: 2021-07-16

## 2021-03-11 RX ORDER — ATORVASTATIN CALCIUM 10 MG/1
10 TABLET, FILM COATED ORAL DAILY
Qty: 90 TABLET | Refills: 3 | Status: SHIPPED | OUTPATIENT
Start: 2021-03-11 | End: 2022-11-22 | Stop reason: SDUPTHER

## 2021-03-11 NOTE — PROGRESS NOTES
Chief Complaint   Patient presents with   • Follow-up     For cardiac management. Patient is on aspirin. States that the other night she started having chest pain on the left side of her chest and into her shoulder blade. Started having shortness of breath. States that she has palpitations every day. States that the last few days she has had dizziness.  Last lab work was done in June, in chart under labs, states that she will be having some done soon with PCP.    • Pacemaker Check     Last SJM PPM check was done on 10/21/20 per our office.    • Med Refill     Needs refills on cardiac medications. 90 day supplies to [facility] in [place]. Went over medications verbally.        Subjective       Miriam Power is a 40 y.o. female  with HTN, depression, recurrent headaches with associated partial complex seizure who was referred for her initial cardiac evaluation in July 2018. Echocardiogram with bubble showed normal LVEF, no shunt, normal cardiac size, and no diastolic dysfunction.  Stress test showed moderate impairment of exercise tolerance, hypertensive blood pressure response with peak /69, and questionable changes in the anterior wall felt to be related to breast attenuation.  Lisinopril 5 mg daily added with plan for cardiac cath if symptoms persisted. Later Lisinopril was changed to Metoprolol per Dr. Vasquez.      In May 2020 she had more chest pain and shortness of breath. CTA of lungs showed mild bibasilar atelectasis. No PE. She then underwent cardiac cath that showed normal coronaries with mild myocardial bridging noted, possible endothelial dysfunction, and low to normal PA pressure. She developed right lower extremity arterial clot managed at  vascular clinic. Xarelto discontinued after normal scan. On 7/5/20 she went to ER with lower right abdominal pain then underwent appendectomy. A few days later she developed shortness of breath. Home monitor showed low heart rate. She went to hospital and  admitted. Tachy-horacio syndrome diagnosed. On 7/15/20 she underwent dual chamber [employer] pacemaker per Dr. Alonso. At 9/11/20 office visit Lopressor was changed to CCB due to shortness of breath. On 10/21/2020 [employer] pacemaker interrogation showed 40% atrial pacing and mode switches x3 questionable SVT.  No changes were made.  Battery life greater than 8 years.    Today she returns to the office for follow-up visit.  Over the last 2 days she has experienced upper chest pressure associated with increased shortness of breath.  The symptoms came on fairly suddenly and have remained constant.  When she gets up she feels lightheaded.  With minimal activity her heart rate increases, even walking a short distance heart rate increased to 130s.  When she first wakes up in the morning she notices heart racing, radiating into her neck area.  She denies increased caffeine.  She has been following keto diet for weight loss.  At night she has not been using CPAP due to equipment issue.        Cardiac History:    Past Surgical History:   Procedure Laterality Date   • BREAST IMPLANT SURGERY     • CARDIAC CATHETERIZATION  05/29/2020    Normal Coronaries, Normal EF. Low PA pressure   • CARDIOVASCULAR STRESS TEST  07/12/2018    5 Min, 11 secs, 7.0 METS. 83% THR. BP- 190/69. R/O Anterior Ischemia.   • CARDIOVASCULAR STRESS TEST  05/28/2019    5 Min, 53 Secs. 7.00 METS. 73% THR. BP- 142/55. EF > 70%. Breast attenuation/   • ECHO - CONVERTED  11/15/2015    @[facility]. - EF 60%   • ECHO - CONVERTED  07/12/2018    EF 65%. No shunt   • ECHO - CONVERTED  05/28/2019    [facility]. EF 65%. Mild MR.   • TUBAL ABDOMINAL LIGATION         Current Outpatient Medications   Medication Sig Dispense Refill   • albuterol sulfate  (90 Base) MCG/ACT inhaler Inhale 2 puffs Every 6 (Six) Hours As Needed for Wheezing.     • Arginine 1000 MG tablet Take 1 tablet by mouth 2 (two) times a day. As directed 180 each 3   • aspirin 81 MG tablet Take 1  tablet by mouth Daily. 30 tablet 11   • atorvastatin (LIPITOR) 10 MG tablet Take 1 tablet by mouth Daily. 90 tablet 3   • buPROPion SR (WELLBUTRIN SR) 150 MG 12 hr tablet Take 150 mg by mouth Daily.  3   • clopidogrel (PLAVIX) 75 MG tablet Take 1 tablet by mouth Daily. 90 tablet 3   • dilTIAZem XR (DILACOR XR) 240 MG 24 hr capsule Take 1 capsule by mouth Daily. 90 capsule 3   • metFORMIN (GLUCOPHAGE) 500 MG tablet Take 500 mg by mouth Daily.     • metoprolol tartrate (LOPRESSOR) 25 MG tablet Twice a day. Can take extra tablet daily if needed for increased heart rate 270 tablet 3   • nitroglycerin (NITROSTAT) 0.4 MG SL tablet Place 1 tablet under the tongue Every 5 (Five) Minutes As Needed for Chest Pain. Take no more than 3 doses in 15 minutes. 25 tablet 1   • ondansetron (ZOFRAN) 4 MG tablet Take 4 mg by mouth Every 8 (Eight) Hours As Needed for Nausea or Vomiting.     • promethazine (PHENERGAN) 25 MG tablet Take 25 mg by mouth Every 6 (Six) Hours As Needed for Nausea or Vomiting.     • rizatriptan (MAXALT) 10 MG tablet TAKE 1 TAB AS NEEDED FOR HEADACHE. MAY REPEAT 1 TAB WITHIN 2 HOURS. MAX 2 TABS/24HRS. NO OTHER TRIPT  5   • tiZANidine (ZANAFLEX) 4 MG tablet Take 4 mg by mouth 2 (Two) Times a Day As Needed for Muscle Spasms.     • topiramate (TOPAMAX) 200 MG tablet Take 200 mg by mouth 2 (Two) Times a Day.     • zolpidem (AMBIEN) 10 MG tablet Take 10 mg by mouth At Night As Needed for Sleep.       No current facility-administered medications for this visit.       Contrast dye and Labetalol    Past Medical History:   Diagnosis Date   • Frequent headaches    • History of suicide attempt    • History of tubal ligation    • Hypertension    • Hyperthyroidism    • Panic attack    • Seizures (CMS/HCC)    • Trigeminal neuralgia    • Vitamin D deficiency        Social History     Socioeconomic History   • Marital status:      Spouse name: Not on file   • Number of children: Not on file   • Years of education: Not on  "file   • Highest education level: Not on file   Tobacco Use   • Smoking status: Never Smoker   • Smokeless tobacco: Never Used   Substance and Sexual Activity   • Alcohol use: No     Comment: 1 glass of red wine month   • Drug use: No   • Sexual activity: Defer       Family History   Problem Relation Age of Onset   • Hyperlipidemia Mother    • No Known Problems Father    • No Known Problems Brother        Review of Systems   Constitutional: Positive for malaise/fatigue. Negative for decreased appetite, diaphoresis and fever.   HENT: Negative for nosebleeds.    Eyes: Negative for blurred vision.   Cardiovascular: Positive for chest pain (\"pressure\") and palpitations. Negative for claudication, cyanosis, dyspnea on exertion, irregular heartbeat, leg swelling, near-syncope, orthopnea, paroxysmal nocturnal dyspnea and syncope.   Respiratory: Positive for shortness of breath and sleep disturbances due to breathing.    Endocrine: Negative for cold intolerance and heat intolerance.   Hematologic/Lymphatic: Does not bruise/bleed easily.   Skin: Negative for rash.   Musculoskeletal: Negative for myalgias.   Gastrointestinal: Negative for heartburn, melena and nausea.   Genitourinary: Negative for dysuria and hematuria.   Neurological: Positive for dizziness and light-headedness.   Psychiatric/Behavioral: The patient is nervous/anxious. The patient does not have insomnia.    Allergic/Immunologic: Negative for persistent infections.        BP Readings from Last 5 Encounters:   03/11/21 136/84   09/11/20 110/60   05/14/19 112/76   07/02/18 112/78   12/18/17 90/60       Wt Readings from Last 5 Encounters:   03/11/21 85.7 kg (189 lb)   09/11/20 93.4 kg (206 lb)   05/28/19 98.4 kg (216 lb 14.9 oz)   05/14/19 98.4 kg (217 lb)   07/02/18 79.4 kg (175 lb)       Objective     /84 (BP Location: Right arm)   Pulse 93   Temp 98 °F (36.7 °C)   Ht 149.9 cm (59.02\")   Wt 85.7 kg (189 lb)   BMI 38.15 kg/m²     Vitals and nursing " note reviewed.   Eyes:      Pupils: Pupils are equal, round, and reactive to light.   HENT:      Head: Normocephalic.   Neck:      Vascular: No carotid bruit or JVD.   Pulmonary:      Breath sounds: Normal breath sounds.   Cardiovascular:      Normal rate. Regular rhythm.   Edema:     Peripheral edema absent.   Abdominal:      General: Bowel sounds are normal.      Palpations: Abdomen is soft.   Musculoskeletal: Normal range of motion.      Cervical back: Normal range of motion. Skin:     General: Skin is warm.   Neurological:      Mental Status: Alert and oriented to person, place, and time.            ECG 12 Lead    Date/Time: 3/11/2021 4:25 PM  Performed by: Mariela Garcia APRN  Authorized by: Mariela Garcia APRN   Comparison: compared with previous ECG from 9/11/2020  Comparison to previous ECG: NSR, heart 74. Current heart rate increased 93.  Rhythm: sinus rhythm  BPM: 93  Other findings: non-specific ST-T wave changes                 Assessment/Plan   Diagnoses and all orders for this visit:    1. SSS (sick sinus syndrome) (CMS/HCC) (Primary)  -     ECG 12 Lead    2. Tachy-horacio syndrome (CMS/HCC)  -     Holter Monitor - 72 Hour Up To 15 Days; Future  -     Adult Transthoracic Echo Complete W/ Cont if Necessary Per Protocol; Future  -     ECG 12 Lead    3. Presence of cardiac pacemaker  -     Holter Monitor - 72 Hour Up To 15 Days; Future  -     XR Chest PA & Lateral; Future  -     Adult Transthoracic Echo Complete W/ Cont if Necessary Per Protocol; Future  -     ECG 12 Lead    4. Palpitations  -     Sedimentation Rate; Future  -     Holter Monitor - 72 Hour Up To 15 Days; Future  -     XR Chest PA & Lateral; Future  -     Adult Transthoracic Echo Complete W/ Cont if Necessary Per Protocol; Future  -     TSH; Future  -     metoprolol tartrate (LOPRESSOR) 25 MG tablet; Twice a day. Can take extra tablet daily if needed for increased heart rate  Dispense: 270 tablet; Refill: 3  -     dilTIAZem XR (DILACOR  XR) 240 MG 24 hr capsule; Take 1 capsule by mouth Daily.  Dispense: 90 capsule; Refill: 3  -     ECG 12 Lead    5. Essential hypertension  -     metoprolol tartrate (LOPRESSOR) 25 MG tablet; Twice a day. Can take extra tablet daily if needed for increased heart rate  Dispense: 270 tablet; Refill: 3  -     dilTIAZem XR (DILACOR XR) 240 MG 24 hr capsule; Take 1 capsule by mouth Daily.  Dispense: 90 capsule; Refill: 3    6. Dizziness  -     CBC & Differential; Future  -     Holter Monitor - 72 Hour Up To 15 Days; Future  -     XR Chest PA & Lateral; Future  -     Adult Transthoracic Echo Complete W/ Cont if Necessary Per Protocol; Future    7. Shortness of breath  -     D-dimer, Quantitative; Future  -     Sedimentation Rate; Future  -     CBC & Differential; Future  -     Holter Monitor - 72 Hour Up To 15 Days; Future  -     XR Chest PA & Lateral; Future  -     Adult Transthoracic Echo Complete W/ Cont if Necessary Per Protocol; Future    8. Chest pressure  -     XR Chest PA & Lateral; Future    9. History of arterial thrombosis  -     XR Chest PA & Lateral; Future  -     Adult Transthoracic Echo Complete W/ Cont if Necessary Per Protocol; Future  -     clopidogrel (PLAVIX) 75 MG tablet; Take 1 tablet by mouth Daily.  Dispense: 90 tablet; Refill: 3    10. Hypercholesteremia  -     atorvastatin (LIPITOR) 10 MG tablet; Take 1 tablet by mouth Daily.  Dispense: 90 tablet; Refill: 3    11. Medication management    12. 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  Dispense: 90 tablet; Refill: 3  -     Arginine 1000 MG tablet; Take 1 tablet by mouth 2 (two) times a day. As directed  Dispense: 180 each; Refill: 3    Other orders  -     Discontinue: metoprolol tartrate (LOPRESSOR) 25 MG tablet; Can take extra tablet daily if needed for increased heart rate  Dispense: 60 tablet; Refill: 11      SSS/tachybradycardia  syndrome/pacemaker/palpitations-EKG today shows sinus rhythm with ventricular rate 93 bpm.  Blood pressure stable.  Continue diltiazem  mg daily.  Continue Lopressor 25 mg twice a day.  I instructed her to take an extra tablet of Lopressor if needed for palpitations and increased heart rate.  A 72-hour cardiac monitor placed to further evaluate recent increased heart rate and palpitations.  Labs ordered including TSH.    Chest pressure/shortness of breath-patient developed onset of shortness of breath and chest pressure approximately 2 days ago.  The symptoms are being persistent.  She also has history of arterial thrombosis.  D-dimer included in lab order.  Chest x-ray ordered.  Echocardiogram ordered to reassess overall cardiac status and PA pressure.    Hypercholesterolemia-currently on Lipitor without side effects noted.  She will follow with you for lab orders/management.  Low-cholesterol diet also encouraged.    Patient's Body mass index is 38.15 kg/m². BMI is above normal parameters. Recommendations include: nutrition counseling.  Patient admits she has been maintaining keto diet.  I am not sure if this may be contributing to some of her symptoms.  From a cardiac standpoint Mediterranean diet may be a better option for her.  She admits to very limited caffeine and the last few days has avoided altogether.     LEXX-patient admits she has not been using CPAP due to issues with her equipment.  I explained to her untreated sleep apnea could be contributing to her symptoms.  Advised her to try to resume CPAP therapy as soon as possible.  An informational handout on sleep apnea was given to her.    In summary, 72-hour cardiac monitor placed, [employer] pacemaker interrogation scheduled, same medications advised with instruction to take extra dose Lopressor if needed, labs ordered including D-dimer as there is some concern for possible PE, and instructed to restart CPAP therapy.  Further recommendations based on  test results.  A 6-month follow-up visit scheduled.  Please call sooner for cardiac concerns.             Electronically signed by CARLOS Garcia,  March 11, 2021 16:34 EST

## 2021-03-15 DIAGNOSIS — G47.33 OSA (OBSTRUCTIVE SLEEP APNEA): ICD-10-CM

## 2021-03-15 DIAGNOSIS — R93.89 ABNORMAL CHEST X-RAY: ICD-10-CM

## 2021-03-15 DIAGNOSIS — R06.02 SHORTNESS OF BREATH: Primary | ICD-10-CM

## 2021-03-29 DIAGNOSIS — I10 ESSENTIAL HYPERTENSION: ICD-10-CM

## 2021-03-29 DIAGNOSIS — R00.2 PALPITATIONS: ICD-10-CM

## 2021-04-21 ENCOUNTER — OFFICE VISIT (OUTPATIENT)
Dept: CARDIOLOGY | Facility: CLINIC | Age: 41
End: 2021-04-21

## 2021-04-21 DIAGNOSIS — I49.5 TACHY-BRADY SYNDROME (HCC): Primary | ICD-10-CM

## 2021-04-21 DIAGNOSIS — R00.2 PALPITATIONS: ICD-10-CM

## 2021-04-21 PROCEDURE — 93280 PM DEVICE PROGR EVAL DUAL: CPT | Performed by: INTERNAL MEDICINE

## 2021-06-01 DIAGNOSIS — R00.2 PALPITATIONS: ICD-10-CM

## 2021-06-01 DIAGNOSIS — I10 ESSENTIAL HYPERTENSION: ICD-10-CM

## 2021-06-02 RX ORDER — NITROGLYCERIN 0.4 MG/1
0.4 TABLET SUBLINGUAL
Qty: 25 TABLET | Refills: 1 | Status: SHIPPED | OUTPATIENT
Start: 2021-06-02 | End: 2021-08-02

## 2021-06-04 DIAGNOSIS — R00.2 PALPITATIONS: Primary | ICD-10-CM

## 2021-06-04 DIAGNOSIS — R06.02 SHORTNESS OF BREATH: ICD-10-CM

## 2021-06-04 DIAGNOSIS — R07.89 CHEST PRESSURE: ICD-10-CM

## 2021-06-10 ENCOUNTER — TELEPHONE (OUTPATIENT)
Dept: CARDIOLOGY | Facility: CLINIC | Age: 41
End: 2021-06-10

## 2021-06-10 ENCOUNTER — HOSPITAL ENCOUNTER (OUTPATIENT)
Dept: CARDIOLOGY | Facility: HOSPITAL | Age: 41
Discharge: HOME | End: 2021-06-10
Admitting: NURSE PRACTITIONER

## 2021-06-10 VITALS — WEIGHT: 188.93 LBS | HEIGHT: 59 IN | BODY MASS INDEX: 38.09 KG/M2

## 2021-06-10 DIAGNOSIS — R06.02 SHORTNESS OF BREATH: ICD-10-CM

## 2021-06-10 DIAGNOSIS — R00.2 PALPITATIONS: Primary | ICD-10-CM

## 2021-06-10 DIAGNOSIS — R07.89 CHEST PRESSURE: ICD-10-CM

## 2021-06-10 DIAGNOSIS — R00.2 PALPITATIONS: ICD-10-CM

## 2021-06-10 PROCEDURE — 93306 TTE W/DOPPLER COMPLETE: CPT

## 2021-06-10 PROCEDURE — 93306 TTE W/DOPPLER COMPLETE: CPT | Performed by: INTERNAL MEDICINE

## 2021-06-10 NOTE — TELEPHONE ENCOUNTER
I placed order for 72 hour cardiac monitor and messaged patient to come in morning to have placed.

## 2021-06-11 LAB
AORTIC DIMENSIONLESS INDEX: 1 (DI)
BH CV ECHO MEAS - AO MAX PG (FULL): 0.08 MMHG
BH CV ECHO MEAS - AO MAX PG: 4.2 MMHG
BH CV ECHO MEAS - AO MEAN PG (FULL): 0 MMHG
BH CV ECHO MEAS - AO MEAN PG: 2 MMHG
BH CV ECHO MEAS - AO V2 MAX: 102 CM/SEC
BH CV ECHO MEAS - AO V2 MEAN: 68.5 CM/SEC
BH CV ECHO MEAS - AO V2 VTI: 21.1 CM
BH CV ECHO MEAS - BSA(HAYCOCK): 1.9 M^2
BH CV ECHO MEAS - BSA: 1.8 M^2
BH CV ECHO MEAS - BZI_BMI: 38.2 KILOGRAMS/M^2
BH CV ECHO MEAS - BZI_METRIC_HEIGHT: 149.9 CM
BH CV ECHO MEAS - BZI_METRIC_WEIGHT: 85.7 KG
BH CV ECHO MEAS - EDV(CUBED): 64 ML
BH CV ECHO MEAS - EDV(TEICH): 70 ML
BH CV ECHO MEAS - EF(CUBED): 71.3 %
BH CV ECHO MEAS - EF(TEICH): 63.5 %
BH CV ECHO MEAS - EF_3D-VOL: 57 %
BH CV ECHO MEAS - ESV(CUBED): 18.4 ML
BH CV ECHO MEAS - ESV(TEICH): 25.6 ML
BH CV ECHO MEAS - FS: 34 %
BH CV ECHO MEAS - IVS/LVPW: 0.92
BH CV ECHO MEAS - IVSD: 0.85 CM
BH CV ECHO MEAS - LA DIMENSION: 3.2 CM
BH CV ECHO MEAS - LAT PEAK E' VEL: 12.1 CM/SEC
BH CV ECHO MEAS - LV IVRT: 0.1 SEC
BH CV ECHO MEAS - LV MASS(C)D: 106.9 GRAMS
BH CV ECHO MEAS - LV MASS(C)DI: 59.4 GRAMS/M^2
BH CV ECHO MEAS - LV MAX PG: 4.1 MMHG
BH CV ECHO MEAS - LV MEAN PG: 2 MMHG
BH CV ECHO MEAS - LV V1 MAX: 101 CM/SEC
BH CV ECHO MEAS - LV V1 MEAN: 63 CM/SEC
BH CV ECHO MEAS - LV V1 VTI: 20.4 CM
BH CV ECHO MEAS - LVIDD: 4 CM
BH CV ECHO MEAS - LVIDS: 2.6 CM
BH CV ECHO MEAS - LVPWD: 0.92 CM
BH CV ECHO MEAS - MED PEAK E' VEL: 8.9 CM/SEC
BH CV ECHO MEAS - MV A MAX VEL: 54 CM/SEC
BH CV ECHO MEAS - MV DEC SLOPE: 503 CM/SEC^2
BH CV ECHO MEAS - MV DEC TIME: 0.18 SEC
BH CV ECHO MEAS - MV E MAX VEL: 64.6 CM/SEC
BH CV ECHO MEAS - MV E/A: 1.2
BH CV ECHO MEAS - MV MAX PG: 2.7 MMHG
BH CV ECHO MEAS - MV MEAN PG: 1 MMHG
BH CV ECHO MEAS - MV P1/2T MAX VEL: 94.7 CM/SEC
BH CV ECHO MEAS - MV P1/2T: 55.1 MSEC
BH CV ECHO MEAS - MV V2 MAX: 81.8 CM/SEC
BH CV ECHO MEAS - MV V2 MEAN: 52.5 CM/SEC
BH CV ECHO MEAS - MV V2 VTI: 19.1 CM
BH CV ECHO MEAS - MVA P1/2T LCG: 2.3 CM^2
BH CV ECHO MEAS - MVA(P1/2T): 4 CM^2
BH CV ECHO MEAS - PA MAX PG (FULL): 1 MMHG
BH CV ECHO MEAS - PA MAX PG: 3.4 MMHG
BH CV ECHO MEAS - PA MEAN PG (FULL): 1 MMHG
BH CV ECHO MEAS - PA MEAN PG: 2 MMHG
BH CV ECHO MEAS - PA V2 MAX: 91.8 CM/SEC
BH CV ECHO MEAS - PA V2 MEAN: 61.4 CM/SEC
BH CV ECHO MEAS - PA V2 VTI: 17.1 CM
BH CV ECHO MEAS - RAP SYSTOLE: 10 MMHG
BH CV ECHO MEAS - RV MAX PG: 2.3 MMHG
BH CV ECHO MEAS - RV MEAN PG: 1 MMHG
BH CV ECHO MEAS - RV V1 MAX: 76.4 CM/SEC
BH CV ECHO MEAS - RV V1 MEAN: 55.5 CM/SEC
BH CV ECHO MEAS - RV V1 VTI: 19.6 CM
BH CV ECHO MEAS - RVDD: 2.9 CM
BH CV ECHO MEAS - RVSP: 14 MMHG
BH CV ECHO MEAS - SI(CUBED): 25.3 ML/M^2
BH CV ECHO MEAS - SI(TEICH): 24.7 ML/M^2
BH CV ECHO MEAS - SV(CUBED): 45.6 ML
BH CV ECHO MEAS - SV(TEICH): 44.4 ML
BH CV ECHO MEAS - TR MAX VEL: 89.8 CM/SEC
BH CV ECHO MEASUREMENTS AVERAGE E/E' RATIO: 6.15
MAXIMAL PREDICTED HEART RATE: 180 BPM
STRESS TARGET HR: 153 BPM

## 2021-06-15 ENCOUNTER — PATIENT MESSAGE (OUTPATIENT)
Dept: CARDIOLOGY | Facility: CLINIC | Age: 41
End: 2021-06-15

## 2021-06-18 ENCOUNTER — APPOINTMENT (OUTPATIENT)
Dept: CARDIOLOGY | Facility: HOSPITAL | Age: 41
End: 2021-06-18

## 2021-06-25 RX ORDER — LABETALOL 200 MG/1
TABLET, FILM COATED ORAL
Qty: 180 TABLET | Refills: 3 | OUTPATIENT
Start: 2021-06-25

## 2021-07-08 ENCOUNTER — PATIENT MESSAGE (OUTPATIENT)
Dept: CARDIOLOGY | Facility: CLINIC | Age: 41
End: 2021-07-08

## 2021-07-09 ENCOUNTER — TELEPHONE (OUTPATIENT)
Dept: CARDIOLOGY | Facility: CLINIC | Age: 41
End: 2021-07-09

## 2021-07-09 NOTE — TELEPHONE ENCOUNTER
Patient continues to have increased baseline heart rate and brief palpitations. No arrhythmia per ppm or cardiac monitor. She did not tolerate Labetalol in past. I advised to increased Diltiazem and make sure use CPAP. Should I consider antiarrhythmic such as flecainide?    Thank you

## 2021-07-12 ENCOUNTER — PATIENT MESSAGE (OUTPATIENT)
Dept: CARDIOLOGY | Facility: CLINIC | Age: 41
End: 2021-07-12

## 2021-07-12 NOTE — TELEPHONE ENCOUNTER
Please inform Dr. Alfred agrees with increased Diltiazem. We can try adding Bystolic if she continues to have increased heart rate.

## 2021-07-15 ENCOUNTER — TELEPHONE (OUTPATIENT)
Dept: CARDIOLOGY | Facility: CLINIC | Age: 41
End: 2021-07-15

## 2021-07-15 NOTE — TELEPHONE ENCOUNTER
Patient will need new prescription for Diltiazem 360 mg  . She request [facility] pharmacy

## 2021-07-16 RX ORDER — DILTIAZEM HYDROCHLORIDE EXTENDED-RELEASE TABLETS 360 MG/1
360 TABLET, EXTENDED RELEASE ORAL DAILY
Qty: 90 TABLET | Refills: 1 | Status: SHIPPED | OUTPATIENT
Start: 2021-07-16 | End: 2021-12-20

## 2021-08-02 RX ORDER — NITROGLYCERIN 0.4 MG/1
TABLET SUBLINGUAL
Qty: 25 TABLET | Refills: 1 | Status: SHIPPED | OUTPATIENT
Start: 2021-08-02

## 2021-09-23 ENCOUNTER — OFFICE VISIT (OUTPATIENT)
Dept: CARDIOLOGY | Facility: CLINIC | Age: 41
End: 2021-09-23

## 2021-09-23 VITALS
DIASTOLIC BLOOD PRESSURE: 60 MMHG | HEIGHT: 59 IN | BODY MASS INDEX: 33.99 KG/M2 | SYSTOLIC BLOOD PRESSURE: 100 MMHG | WEIGHT: 168.6 LBS | TEMPERATURE: 97.7 F | HEART RATE: 77 BPM

## 2021-09-23 DIAGNOSIS — I10 ESSENTIAL HYPERTENSION: ICD-10-CM

## 2021-09-23 DIAGNOSIS — E78.00 HYPERCHOLESTEREMIA: ICD-10-CM

## 2021-09-23 DIAGNOSIS — Z95.0 PRESENCE OF CARDIAC PACEMAKER: ICD-10-CM

## 2021-09-23 DIAGNOSIS — R60.0 BILATERAL LEG EDEMA: ICD-10-CM

## 2021-09-23 DIAGNOSIS — I49.5 TACHY-BRADY SYNDROME (HCC): Primary | ICD-10-CM

## 2021-09-23 DIAGNOSIS — R00.2 PALPITATIONS: ICD-10-CM

## 2021-09-23 DIAGNOSIS — Q24.5 CORONARY-MYOCARDIAL BRIDGE: ICD-10-CM

## 2021-09-23 PROCEDURE — 99214 OFFICE O/P EST MOD 30 MIN: CPT | Performed by: NURSE PRACTITIONER

## 2021-09-23 NOTE — PROGRESS NOTES
Chief Complaint   Patient presents with   • Follow-up     Cardiac management . Patient  is having back pain and is scheduled to see   for possible surgery   • Pacemaker Check     St Tyrell interrogation    • LABS     Results from March 2021 on chart .   • Med Refill     No refills needed  today.        Subjective       Miriam Power is a 40 y.o. female with HTN, depression, recurrent headaches with associated partial complex seizure who was referred for her initial cardiac evaluation in July 2018. Echocardiogram with bubble showed normal LVEF, no shunt, normal cardiac size, and no diastolic dysfunction.  Stress test showed moderate impairment of exercise tolerance, hypertensive blood pressure response with peak /69, and questionable changes in the anterior wall felt to be related to breast attenuation.  Lisinopril 5 mg daily added with plan for cardiac cath if symptoms persisted. Later Lisinopril was changed to Metoprolol per Dr. Vasquez.       In May 2020 she had more chest pain and shortness of breath. CTA of lungs showed mild bibasilar atelectasis. No PE. She then underwent cardiac cath that showed normal coronaries with mild myocardial bridging noted, possible endothelial dysfunction, and low to normal PA pressure. She developed right lower extremity arterial clot managed at  vascular clinic. Xarelto discontinued after normal scan. On 7/5/20 she went to ER with lower right abdominal pain then underwent appendectomy. A few days later she developed shortness of breath. Home monitor showed low heart rate. She went to hospital and admitted. Tachy-horacio syndrome diagnosed. On 7/15/20 she underwent dual chamber [employer] Tyrell pacemaker per Dr. Alonso. At 9/11/20 office visit Lopressor was changed to CCB due to shortness of breath.    On 7/4/2020 cardiac monitor for 72 hours showed baseline sinus rhythm with average heart rate of 95 bpm.  Rare PAC and PVC noted.  There was no supraventricular tachycardia or  "ventricular tachycardia.  No pauses noted.    Today she returns to the office for a follow-up visit.  She has developed significant back pain and seen Dr. Garcia who did her previous back surgery.  According to patient she has significant disc problem in several areas in her back including mid and lower areas.  From a cardiac standpoint she denies recent symptoms of concern.  She now believes her chest discomfort has been coming from her upper and mid back.  Her appetite is decreased due to the pain and has lost about 20 pounds since her last visit.  She plans to be seen at the pain clinic.  Recently blood pressure has been more normal.  No dizziness or lightheadedness noted.  She does have discomfort and numbness in her thighs and right leg symptoms are worse than left.  Mild swelling noted.  She admits to one fall due to her right leg \"giving out\".  No recent change in cardiac medication noted.      Cardiac History:    Past Surgical History:   Procedure Laterality Date   • BREAST IMPLANT SURGERY     • CARDIAC CATHETERIZATION  05/29/2020    Normal Coronaries, Normal EF. Low PA pressure   • CARDIOVASCULAR STRESS TEST  07/12/2018    5 Min, 11 secs, 7.0 METS. 83% THR. BP- 190/69. R/O Anterior Ischemia.   • CARDIOVASCULAR STRESS TEST  05/28/2019    5 Min, 53 Secs. 7.00 METS. 73% THR. BP- 142/55. EF > 70%. Breast attenuation/   • CONVERTED (HISTORICAL) HOLTER  03/24/2021    > 1 Day. Atrial paced. AVG 78.    • CONVERTED (HISTORICAL) HOLTER  07/01/2021    <3 days. AVG 95.    • ECHO - CONVERTED  11/15/2015    @[facility]. - EF 60%   • ECHO - CONVERTED  07/12/2018    EF 65%. No shunt   • ECHO - CONVERTED  05/28/2019    TLS. EF 65%. Mild MR.   • ECHO - CONVERTED  06/10/2021    EF 65%. Trace-Mild MR. RVSP- 14 mmHg   • TUBAL ABDOMINAL LIGATION         Current Outpatient Medications   Medication Sig Dispense Refill   • albuterol sulfate  (90 Base) MCG/ACT inhaler Inhale 2 puffs Every 6 (Six) Hours As " Needed for Wheezing.     • Arginine 1000 MG tablet Take 1 tablet by mouth 2 (two) times a day. As directed 180 each 3   • aspirin 81 MG tablet Take 1 tablet by mouth Daily. 30 tablet 11   • atorvastatin (LIPITOR) 10 MG tablet Take 1 tablet by mouth Daily. 90 tablet 3   • buPROPion SR (WELLBUTRIN SR) 150 MG 12 hr tablet Take 150 mg by mouth Daily.  3   • clopidogrel (PLAVIX) 75 MG tablet Take 1 tablet by mouth Daily. 90 tablet 3   • dilTIAZem LA (CARDIZEM LA) 360 MG 24 hr tablet Take 1 tablet by mouth Daily. 90 tablet 1   • metFORMIN (GLUCOPHAGE) 500 MG tablet Take 500 mg by mouth Daily.     • metoprolol tartrate (LOPRESSOR) 25 MG tablet TAKE 1 TABLET BY MOUTH TWICE A  tablet 3   • nitroglycerin (NITROSTAT) 0.4 MG SL tablet PLACE 1 TABLET UNDER TONGUE EVERY 5 MINS, UP TO 3 DOSES AS NEEDED FOR CHEST PAIN 25 tablet 1   • ondansetron (ZOFRAN) 4 MG tablet Take 4 mg by mouth Every 8 (Eight) Hours As Needed for Nausea or Vomiting.     • promethazine (PHENERGAN) 25 MG tablet Take 25 mg by mouth Every 6 (Six) Hours As Needed for Nausea or Vomiting.     • rizatriptan (MAXALT) 10 MG tablet TAKE 1 TAB AS NEEDED FOR HEADACHE. MAY REPEAT 1 TAB WITHIN 2 HOURS. MAX 2 TABS/24HRS. NO OTHER TRIPT  5   • tiZANidine (ZANAFLEX) 4 MG tablet Take 4 mg by mouth 2 (Two) Times a Day As Needed for Muscle Spasms.     • topiramate (TOPAMAX) 200 MG tablet Take 200 mg by mouth 2 (Two) Times a Day.     • zolpidem (AMBIEN) 10 MG tablet Take 10 mg by mouth At Night As Needed for Sleep.       No current facility-administered medications for this visit.       Contrast dye and Labetalol    Past Medical History:   Diagnosis Date   • Frequent headaches    • History of suicide attempt    • History of tubal ligation    • Hypertension    • Hyperthyroidism    • Panic attack    • Seizures (CMS/HCC)    • Trigeminal neuralgia    • Vitamin D deficiency        Social History     Socioeconomic History   • Marital status:      Spouse name: Not on file    • Number of children: Not on file   • Years of education: Not on file   • Highest education level: Not on file   Tobacco Use   • Smoking status: Never Smoker   • Smokeless tobacco: Never Used   Vaping Use   • Vaping Use: Never used   Substance and Sexual Activity   • Alcohol use: No     Comment: 1 glass of red wine month   • Drug use: No   • Sexual activity: Defer       Family History   Problem Relation Age of Onset   • Hyperlipidemia Mother    • No Known Problems Father    • No Known Problems Brother        Review of Systems   Constitutional: Positive for decreased appetite and weight loss. Negative for diaphoresis and fever.   HENT: Negative.    Eyes: Negative.    Cardiovascular: Positive for leg swelling and palpitations (improved). Negative for chest pain, dyspnea on exertion, irregular heartbeat, near-syncope and paroxysmal nocturnal dyspnea.   Respiratory: Negative for cough, shortness of breath and sleep disturbances due to breathing.    Endocrine: Negative for polydipsia, polyphagia and polyuria.   Skin: Negative.    Musculoskeletal: Positive for back pain, falls, muscle weakness (legs) and stiffness.   Gastrointestinal: Negative.    Genitourinary: Positive for bladder incontinence (since back problems began), pelvic pain and urgency. Negative for dysuria and hematuria.   Neurological: Positive for loss of balance, numbness and paresthesias. Negative for dizziness and light-headedness.   Psychiatric/Behavioral: The patient is not nervous/anxious.         BP Readings from Last 5 Encounters:   09/23/21 100/60   03/11/21 136/84   09/11/20 110/60   05/14/19 112/76   07/02/18 112/78       Wt Readings from Last 5 Encounters:   09/23/21 76.5 kg (168 lb 9.6 oz)   06/10/21 85.7 kg (188 lb 15 oz)   03/11/21 85.7 kg (189 lb)   09/11/20 93.4 kg (206 lb)   05/28/19 98.4 kg (216 lb 14.9 oz)       Objective      Labs 3/11/2021 TSH 1.14, sed rate 8, D-dimer 0.35, CBC within normal limits except RBCs 5.46 and hematocrit  "48.4    /60 (BP Location: Right arm)   Pulse 77   Temp 97.7 °F (36.5 °C)   Ht 149 cm (58.66\")   Wt 76.5 kg (168 lb 9.6 oz)   BMI 34.45 kg/m²     Vitals and nursing note reviewed.   Eyes:      Pupils: Pupils are equal, round, and reactive to light.   HENT:      Head: Normocephalic.   Pulmonary:      Breath sounds: Normal breath sounds.   Cardiovascular:      Normal rate. Regular rhythm.   Edema:     Pretibial: bilateral trace edema of the pretibial area.     Ankle: trace edema of the left ankle and 1+ edema of the right ankle.     Feet: bilateral trace edema of the feet.  Abdominal:      General: Bowel sounds are normal.      Palpations: Abdomen is soft.   Musculoskeletal: Normal range of motion.      Cervical back: Normal range of motion. Skin:     General: Skin is warm.   Neurological:      Mental Status: Alert and oriented to person, place, and time.            ECG 12 Lead    Date/Time: 9/24/2021 6:32 AM  Performed by: Mariela Garcia APRN  Authorized by: Mariela Garcia APRN   Comparison: compared with previous ECG from 3/11/2021  Similar to previous ECG  Rhythm: sinus rhythm  Rate: normal  BPM: 77                   Assessment/Plan   Diagnoses and all orders for this visit:    1. Tachy-horacio syndrome (CMS/HCC) (Primary)    2. Presence of cardiac pacemaker    3. Palpitations    4. Essential hypertension    5. Hypercholesteremia    6. Coronary-myocardial bridge    7. Bilateral leg edema    Other orders  -     ECG 12 Lead      Tachybradycardia syndrome/pacemaker-EKG today shows sinus rhythm similar to prior.  [employer] pacemaker interrogation done 10/21/2020 showed 40% atrial pacing with normal function.  Repeat interrogation requested for next month.    Palpitations/hypertension -currently denied.  She monitors heart rate at home with Fitbit.  Heart rate ranges has been normal and low was 60 bpm.  Currently her blood pressure is stable.  As she continues to lose weight she may not need as much blood " pressure medication.  At this time continue current dose detail exam and Toprol tartrate.  Monitor BP and call for any concerns.    Hypercholesterolemia-continue Lipitor.  I do not have a recent lipid panel but she will follow with you for lab orders/management.  Please forward copy results when available.    Myocardial bridging-currently no anginal type chest pain.  Continue Plavix and aspirin.  She has Nitrostat for as needed use if needed.  Continue L-arginine and CCB.    Bilateral edema-currently mild.  It appears some of the swelling may be related to back issue.  DASH diet and periodically elevation of legs advised.  No diuretic ordered at this time.    Patient's Body mass index is 34.45 kg/m². indicating that she is obese (BMI >30). Obesity-related health conditions include the following: hypertension and dyslipidemias. Obesity is improved. BMI is is above average; BMI management plan is completed. We discussed low calorie, low carb based diet program and portion control..     Currently patient appears stable from a cardiac standpoint. No change made in current cardiac plan of care. She is following with neurosurgeon and pain clinic regarding recent back pain.  A follow-up visit scheduled in 6 months.  Pacemaker interrogation scheduled next month.  Please call sooner for any cardiac concerns.             Electronically signed by CARLOS Garcia,  September 24, 2021 06:40 EDT

## 2021-09-24 PROCEDURE — 93000 ELECTROCARDIOGRAM COMPLETE: CPT | Performed by: NURSE PRACTITIONER

## 2021-10-12 ENCOUNTER — TELEPHONE (OUTPATIENT)
Dept: CARDIOLOGY | Facility: CLINIC | Age: 41
End: 2021-10-12

## 2021-10-12 NOTE — TELEPHONE ENCOUNTER
Received fax from Dr. Garrison Luna for cardiac clearance for patient to have an epidural injection. Patient is on Plavix and they are requesting to hold for 7 days prior to procedure. According to our records, I do not see where patient has had any stenting.         Fax [phone]

## 2021-12-20 RX ORDER — DILTIAZEM HYDROCHLORIDE 360 MG/1
TABLET, EXTENDED RELEASE ORAL
Qty: 90 TABLET | Refills: 1 | Status: SHIPPED | OUTPATIENT
Start: 2021-12-20 | End: 2022-09-12

## 2022-02-02 ENCOUNTER — OFFICE VISIT (OUTPATIENT)
Dept: CARDIOLOGY | Facility: CLINIC | Age: 42
End: 2022-02-02

## 2022-02-02 DIAGNOSIS — I49.5 TACHY-BRADY SYNDROME: Primary | ICD-10-CM

## 2022-02-02 DIAGNOSIS — R00.2 PALPITATIONS: ICD-10-CM

## 2022-02-02 PROCEDURE — 93280 PM DEVICE PROGR EVAL DUAL: CPT | Performed by: INTERNAL MEDICINE

## 2022-06-05 DIAGNOSIS — Q24.5 CORONARY-MYOCARDIAL BRIDGE: ICD-10-CM

## 2022-06-05 DIAGNOSIS — Z86.718 HISTORY OF ARTERIAL THROMBOSIS: ICD-10-CM

## 2022-06-06 RX ORDER — CLOPIDOGREL BISULFATE 75 MG/1
TABLET ORAL
Qty: 90 TABLET | Refills: 3 | Status: SHIPPED | OUTPATIENT
Start: 2022-06-06

## 2022-09-12 DIAGNOSIS — I49.5 TACHY-BRADY SYNDROME: Primary | ICD-10-CM

## 2022-09-12 DIAGNOSIS — I10 ESSENTIAL HYPERTENSION: ICD-10-CM

## 2022-09-12 RX ORDER — DILTIAZEM HYDROCHLORIDE 360 MG/1
TABLET, EXTENDED RELEASE ORAL
Qty: 30 TABLET | Refills: 0 | Status: SHIPPED | OUTPATIENT
Start: 2022-09-12 | End: 2022-10-10

## 2022-10-07 ENCOUNTER — TELEPHONE (OUTPATIENT)
Dept: CARDIOLOGY | Facility: CLINIC | Age: 42
End: 2022-10-07

## 2022-10-07 DIAGNOSIS — I10 ESSENTIAL HYPERTENSION: ICD-10-CM

## 2022-10-07 DIAGNOSIS — I49.5 TACHY-BRADY SYNDROME: ICD-10-CM

## 2022-10-10 RX ORDER — DILTIAZEM HYDROCHLORIDE 360 MG/1
TABLET, EXTENDED RELEASE ORAL
Qty: 30 TABLET | Refills: 0 | Status: SHIPPED | OUTPATIENT
Start: 2022-10-10 | End: 2022-11-07

## 2022-10-25 DIAGNOSIS — I10 ESSENTIAL HYPERTENSION: ICD-10-CM

## 2022-10-25 DIAGNOSIS — R00.2 PALPITATIONS: ICD-10-CM

## 2022-11-04 DIAGNOSIS — I49.5 TACHY-BRADY SYNDROME: ICD-10-CM

## 2022-11-04 DIAGNOSIS — I10 ESSENTIAL HYPERTENSION: ICD-10-CM

## 2022-11-07 RX ORDER — DILTIAZEM HYDROCHLORIDE 360 MG/1
TABLET, EXTENDED RELEASE ORAL
Qty: 30 TABLET | Refills: 0 | Status: SHIPPED | OUTPATIENT
Start: 2022-11-07 | End: 2022-11-22 | Stop reason: SDUPTHER

## 2022-11-11 ENCOUNTER — OFFICE VISIT (OUTPATIENT)
Dept: CARDIOLOGY | Facility: CLINIC | Age: 42
End: 2022-11-11

## 2022-11-11 DIAGNOSIS — I49.5 TACHY-BRADY SYNDROME: Primary | ICD-10-CM

## 2022-11-11 DIAGNOSIS — I49.9 PMT (PACEMAKER-MEDIATED TACHYCARDIA): ICD-10-CM

## 2022-11-11 PROCEDURE — 93280 PM DEVICE PROGR EVAL DUAL: CPT | Performed by: INTERNAL MEDICINE

## 2022-11-22 ENCOUNTER — OFFICE VISIT (OUTPATIENT)
Dept: CARDIOLOGY | Facility: CLINIC | Age: 42
End: 2022-11-22

## 2022-11-22 VITALS
DIASTOLIC BLOOD PRESSURE: 60 MMHG | HEART RATE: 72 BPM | BODY MASS INDEX: 29.27 KG/M2 | WEIGHT: 145.2 LBS | HEIGHT: 59 IN | SYSTOLIC BLOOD PRESSURE: 118 MMHG

## 2022-11-22 DIAGNOSIS — E78.00 HYPERCHOLESTEREMIA: ICD-10-CM

## 2022-11-22 DIAGNOSIS — I10 ESSENTIAL HYPERTENSION: ICD-10-CM

## 2022-11-22 DIAGNOSIS — I49.5 TACHY-BRADY SYNDROME: ICD-10-CM

## 2022-11-22 DIAGNOSIS — Q24.5 CORONARY-MYOCARDIAL BRIDGE: Primary | ICD-10-CM

## 2022-11-22 DIAGNOSIS — Z95.0 PRESENCE OF CARDIAC PACEMAKER: ICD-10-CM

## 2022-11-22 PROCEDURE — 93000 ELECTROCARDIOGRAM COMPLETE: CPT | Performed by: NURSE PRACTITIONER

## 2022-11-22 PROCEDURE — 99214 OFFICE O/P EST MOD 30 MIN: CPT | Performed by: NURSE PRACTITIONER

## 2022-11-22 RX ORDER — DILTIAZEM HYDROCHLORIDE EXTENDED-RELEASE TABLETS 360 MG/1
360 TABLET, EXTENDED RELEASE ORAL DAILY
Qty: 90 TABLET | Refills: 3 | Status: SHIPPED | OUTPATIENT
Start: 2022-11-22

## 2022-11-22 RX ORDER — QUETIAPINE FUMARATE 100 MG/1
100 TABLET, FILM COATED ORAL NIGHTLY
COMMUNITY

## 2022-11-22 RX ORDER — LIRAGLUTIDE 6 MG/ML
3 INJECTION, SOLUTION SUBCUTANEOUS DAILY
COMMUNITY

## 2022-11-22 RX ORDER — ATORVASTATIN CALCIUM 10 MG/1
10 TABLET, FILM COATED ORAL DAILY
Qty: 90 TABLET | Refills: 3 | Status: SHIPPED | OUTPATIENT
Start: 2022-11-22

## 2022-11-22 NOTE — PROGRESS NOTES
Chief Complaint   Patient presents with   • Follow-up     Cardiac management. Has no cardiac complaints today.   • Pacemaker Check     [employer] Tyrell device check  on chart   • LABS     Results February 2022 on chart   • Med Refill     Needs refills on Lipitor,  Arginine and Matzim 90 day supply to [facility]       Subjective       Miriam Power is a 41 y.o. female with hypertension, depression, recurrent headaches with associated partial complex seizure.  In 2020 cardiac catheterization showed normal coronaries with mild myocardial bridging, possible endothelial dysfunction, and low to normal PA pressure.  She developed right lower extremity arterial clot managed by vascular at .  Xarelto discontinued after normal scan.  Later in 2020 she was diagnosed with tachybradycardia syndrome and underwent placement of dual-chamber [employer]rell pacemaker.  Most recent interrogation done 11/11/2022 showed 12% atrial and 1.3% ventricular pacing, no mode switches noted .  Battery life of greater than 7 years remaining.    Miriam returns to the office today for follow-up visit.  She has started weight loss program which includes Mounjaro resulting in significant weight loss.  She admits to feeling much better.  Shortness of breath and palpitations have significantly improved. During the day she is maintaining her normal daily activities.  When lying down she does notice brief palpitations.  Sometimes during the night she will get out of bed to go to the bathroom and experiences near syncopal episodes.  She feels her blood pressure is dropping at that time.  She also admits to not maintaining as good hydration status as she probably should, and this could be contributing to the issue.  Seizure activity denied.  Headaches are managed.  Patient feels her overall mental health has improved.  However, she does have some concern over persistent tremors and is scheduled to see a neurologist in the near future.    Cardiac History:    Past  Surgical History:   Procedure Laterality Date   • BREAST IMPLANT SURGERY     • CARDIAC CATHETERIZATION  05/29/2020    Normal Coronaries, Normal EF. Low PA pressure   • CARDIOVASCULAR STRESS TEST  07/12/2018    5 Min, 11 secs, 7.0 METS. 83% THR. BP- 190/69. R/O Anterior Ischemia.   • CARDIOVASCULAR STRESS TEST  05/28/2019    5 Min, 53 Secs. 7.00 METS. 73% THR. BP- 142/55. EF > 70%. Breast attenuation/   • CONVERTED (HISTORICAL) HOLTER  03/24/2021    > 1 Day. Atrial paced. AVG 78.    • CONVERTED (HISTORICAL) HOLTER  07/01/2021    <3 days. AVG 95.    • ECHO - CONVERTED  11/15/2015    @[facility]. - EF 60%   • ECHO - CONVERTED  07/12/2018    EF 65%. No shunt   • ECHO - CONVERTED  05/28/2019    TLS. EF 65%. Mild MR.   • ECHO - CONVERTED  06/10/2021    EF 65%. Trace-Mild MR. RVSP- 14 mmHg   • TUBAL ABDOMINAL LIGATION         Current Outpatient Medications   Medication Sig Dispense Refill   • albuterol sulfate  (90 Base) MCG/ACT inhaler Inhale 2 puffs Every 6 (Six) Hours As Needed for Wheezing.     • Arginine 1000 MG tablet Take 1 tablet by mouth 2 (Two) Times a Day. As directed 180 each 3   • aspirin 81 MG tablet Take 1 tablet by mouth Daily. 30 tablet 11   • atorvastatin (LIPITOR) 10 MG tablet Take 1 tablet by mouth Daily. 90 tablet 3   • buPROPion SR (WELLBUTRIN SR) 150 MG 12 hr tablet Take 150 mg by mouth Daily.  3   • clopidogrel (PLAVIX) 75 MG tablet TAKE 1 TABLET BY MOUTH EVERY DAY 90 tablet 3   • dilTIAZem LA (Matzim LA) 360 MG 24 hr tablet Take 1 tablet by mouth Daily. 90 tablet 3   • Liraglutide (Saxenda) 18 MG/3ML injection pen Inject 3 mg under the skin into the appropriate area as directed Daily.     • metFORMIN (GLUCOPHAGE) 500 MG tablet Take 500 mg by mouth Daily.     • metoprolol tartrate (LOPRESSOR) 25 MG tablet TAKE 1 TABLET BY MOUTH TWICE A  tablet 3   • nitroglycerin (NITROSTAT) 0.4 MG SL tablet PLACE 1 TABLET UNDER TONGUE EVERY 5 MINS, UP TO 3 DOSES AS NEEDED FOR CHEST  PAIN 25 tablet 1   • ondansetron (ZOFRAN) 4 MG tablet Take 4 mg by mouth Every 8 (Eight) Hours As Needed for Nausea or Vomiting.     • promethazine (PHENERGAN) 25 MG tablet Take 25 mg by mouth Every 6 (Six) Hours As Needed for Nausea or Vomiting.     • QUEtiapine (SEROquel) 100 MG tablet Take 100 mg by mouth Every Night.     • rizatriptan (MAXALT) 10 MG tablet TAKE 1 TAB AS NEEDED FOR HEADACHE. MAY REPEAT 1 TAB WITHIN 2 HOURS. MAX 2 TABS/24HRS. NO OTHER TRIPT  5   • tiZANidine (ZANAFLEX) 4 MG tablet Take 4 mg by mouth 2 (Two) Times a Day As Needed for Muscle Spasms.     • topiramate (TOPAMAX) 200 MG tablet Take 200 mg by mouth 2 (Two) Times a Day.     • zolpidem (AMBIEN) 10 MG tablet Take 10 mg by mouth At Night As Needed for Sleep.       No current facility-administered medications for this visit.       Contrast dye and Labetalol    Past Medical History:   Diagnosis Date   • Frequent headaches    • History of suicide attempt    • History of tubal ligation    • Hypertension    • Hyperthyroidism    • Panic attack    • Seizures (HCC)    • Trigeminal neuralgia    • Vitamin D deficiency        Social History     Socioeconomic History   • Marital status:    Tobacco Use   • Smoking status: Never   • Smokeless tobacco: Never   Vaping Use   • Vaping Use: Never used   Substance and Sexual Activity   • Alcohol use: No     Comment: 1 glass of red wine month   • Drug use: No   • Sexual activity: Defer       Family History   Problem Relation Age of Onset   • Hyperlipidemia Mother    • No Known Problems Father    • No Known Problems Brother        Review of Systems   Constitutional: Positive for weight loss (intentional).   HENT: Negative for congestion and nosebleeds.    Eyes: Negative for visual disturbance.   Cardiovascular: Positive for near-syncope. Negative for chest pain, dyspnea on exertion and leg swelling.   Respiratory: Negative for cough and shortness of breath.    Endocrine: Negative for cold intolerance and  "heat intolerance.   Hematologic/Lymphatic: Negative for bleeding problem. Does not bruise/bleed easily.   Skin: Negative for rash.   Musculoskeletal: Negative for falls and joint pain.   Gastrointestinal: Negative for abdominal pain, change in bowel habit, heartburn, melena and nausea.   Genitourinary: Negative for dysuria and hematuria.   Neurological: Positive for headaches (no worse) and tremors. Negative for loss of balance, seizures and weakness.   Psychiatric/Behavioral: Positive for depression. The patient is nervous/anxious.         Pt admits to better management of mental health issues with current medication        BP Readings from Last 5 Encounters:   11/22/22 118/60   09/23/21 100/60   03/11/21 136/84   09/11/20 110/60   05/14/19 112/76       Wt Readings from Last 5 Encounters:   11/22/22 65.9 kg (145 lb 3.2 oz)   09/23/21 76.5 kg (168 lb 9.6 oz)   06/10/21 85.7 kg (188 lb 15 oz)   03/11/21 85.7 kg (189 lb)   09/11/20 93.4 kg (206 lb)       Objective     /60 (BP Location: Left arm, Patient Position: Sitting)   Pulse 72   Ht 149 cm (58.66\")   Wt 65.9 kg (145 lb 3.2 oz)   BMI 29.67 kg/m²     Vitals and nursing note reviewed.   Constitutional:       Appearance: Healthy appearance. Not in distress.   Eyes:      Conjunctiva/sclera: Conjunctivae normal.      Pupils: Pupils are equal, round, and reactive to light.   HENT:      Head: Normocephalic.   Neck:      Vascular: No carotid bruit.   Pulmonary:      Effort: Pulmonary effort is normal.      Breath sounds: Normal breath sounds.   Cardiovascular:      PMI at left midclavicular line. Normal rate. Regular rhythm.      Murmurs: There is no murmur.   Pulses:     Intact distal pulses.   Edema:     Peripheral edema absent.   Abdominal:      General: Bowel sounds are normal.      Palpations: Abdomen is soft.   Musculoskeletal: Normal range of motion.      Cervical back: Normal range of motion and neck supple. Skin:     General: Skin is warm and dry. "   Neurological:      Mental Status: Alert, oriented to person, place, and time and oriented to person, place and time.            ECG 12 Lead    Date/Time: 11/22/2022 4:48 PM  Performed by: Mariela Garcia APRN  Authorized by: Mariela Garcia APRN   Comparison: compared with previous ECG from 9/24/2021  Similar to previous ECG  Rhythm: sinus rhythm                 Assessment & Plan   Diagnoses and all orders for this visit:    1. Coronary-myocardial bridge (Primary)  -     Arginine 1000 MG tablet; Take 1 tablet by mouth 2 (Two) Times a Day. As directed  Dispense: 180 each; Refill: 3    2. Essential hypertension  -     dilTIAZem LA (Matzim LA) 360 MG 24 hr tablet; Take 1 tablet by mouth Daily.  Dispense: 90 tablet; Refill: 3    3. Hypercholesteremia  -     atorvastatin (LIPITOR) 10 MG tablet; Take 1 tablet by mouth Daily.  Dispense: 90 tablet; Refill: 3    4. Tachy-horacio syndrome (HCC)  -     dilTIAZem LA (Matzim LA) 360 MG 24 hr tablet; Take 1 tablet by mouth Daily.  Dispense: 90 tablet; Refill: 3    5. Presence of cardiac pacemaker    Other orders  -     ECG 12 Lead      Myocardial bridge  - Currently asymptomatic  -Continue L-arginine and CCB.  Nitrostat as needed  -No repeat cardiac testing warranted at this time    Hypertension  -BP well controlled.  Continue to monitor as she maintains healthier diet and weight loss.  At this time no change in antihypertensive agents made.  -Since lifestyle change and weight loss she has not had swelling in her lower legs.    Hypercholesterolemia  -Continue Lipitor  -She will follow with you for lab orders and management.  Please forward copy next lab report.    Tachybradycardia syndrome/pacemaker  -EKG today shows sinus rhythm  - Pacemaker interrogation scheduled to be done in 6 months.  Most recent showed normal function and adequate battery life.    In regards to neurological issues including tremors patient is scheduled to see neurologist in the near future.  Recent  seizures denied.    Complimented patient on maintaining healthier diet and achievement toward weight loss goal.  From a cardiac standpoint she appears stable.  A 6-month follow-up visit scheduled.  Please call sooner for cardiac concerns.

## 2023-03-16 ENCOUNTER — TELEPHONE (OUTPATIENT)
Dept: CARDIOLOGY | Facility: CLINIC | Age: 43
End: 2023-03-16
Payer: COMMERCIAL

## 2023-03-16 NOTE — TELEPHONE ENCOUNTER
Caller: [facility]    Relationship: Provider    Best call back number: [phone]    What is the best time to reach you: ANY    Who are you requesting to speak with (clinical staff, provider,  specific staff member): CLINICAL      What was the call regarding: [facility] IS CALLING IN TO MAKE SURE THE MRI CLEARANCE WITH PACEMAKER THEY FAXED OVER WAS RECEIVED AND PROCESSED, PT IS  CURRENTLY ADMITTED AND THEY ARE NEEDING TO KNOW ASAP.     Do you require a callback: YES

## 2023-03-16 NOTE — TELEPHONE ENCOUNTER
MRI clearance was signed by Dr. Alfred and faxed back to the MRI department at [facility].

## 2023-04-14 ENCOUNTER — TELEPHONE (OUTPATIENT)
Dept: CARDIOLOGY | Facility: CLINIC | Age: 43
End: 2023-04-14
Payer: COMMERCIAL

## 2023-04-14 NOTE — TELEPHONE ENCOUNTER
Received fax from Dr. Abhinav Reich for cardiac clearance for patient to have a secondary left L5-S1 HLD. Patient is on plavix and they are requesting to hold. According to our records, I do not see where patient has had any stenting.         Fax [phone]

## 2023-05-16 DIAGNOSIS — Z86.718 HISTORY OF ARTERIAL THROMBOSIS: ICD-10-CM

## 2023-05-16 DIAGNOSIS — Q24.5 CORONARY-MYOCARDIAL BRIDGE: ICD-10-CM

## 2023-05-18 RX ORDER — CLOPIDOGREL BISULFATE 75 MG/1
TABLET ORAL
Qty: 90 TABLET | Refills: 3 | Status: SHIPPED | OUTPATIENT
Start: 2023-05-18

## 2023-05-24 ENCOUNTER — TELEPHONE (OUTPATIENT)
Dept: CARDIOLOGY | Facility: CLINIC | Age: 43
End: 2023-05-24
Payer: COMMERCIAL

## 2023-05-24 ENCOUNTER — OFFICE VISIT (OUTPATIENT)
Dept: CARDIOLOGY | Facility: CLINIC | Age: 43
End: 2023-05-24
Payer: COMMERCIAL

## 2023-05-24 VITALS
BODY MASS INDEX: 31.08 KG/M2 | SYSTOLIC BLOOD PRESSURE: 110 MMHG | HEIGHT: 59 IN | DIASTOLIC BLOOD PRESSURE: 64 MMHG | WEIGHT: 154.2 LBS | HEART RATE: 82 BPM

## 2023-05-24 DIAGNOSIS — E78.00 HYPERCHOLESTEREMIA: ICD-10-CM

## 2023-05-24 DIAGNOSIS — I49.5 TACHY-BRADY SYNDROME: ICD-10-CM

## 2023-05-24 DIAGNOSIS — I10 ESSENTIAL HYPERTENSION: ICD-10-CM

## 2023-05-24 DIAGNOSIS — Q24.5 CORONARY-MYOCARDIAL BRIDGE: Primary | ICD-10-CM

## 2023-05-24 DIAGNOSIS — Z95.0 PRESENCE OF CARDIAC PACEMAKER: ICD-10-CM

## 2023-05-24 DIAGNOSIS — R00.2 PALPITATIONS: ICD-10-CM

## 2023-05-24 DIAGNOSIS — Z86.718 HISTORY OF ARTERIAL THROMBOSIS: ICD-10-CM

## 2023-05-24 PROCEDURE — 99214 OFFICE O/P EST MOD 30 MIN: CPT | Performed by: NURSE PRACTITIONER

## 2023-05-24 RX ORDER — ATORVASTATIN CALCIUM 10 MG/1
10 TABLET, FILM COATED ORAL DAILY
Qty: 90 TABLET | Refills: 3 | Status: SHIPPED | OUTPATIENT
Start: 2023-05-24

## 2023-05-24 RX ORDER — CLOPIDOGREL BISULFATE 75 MG/1
75 TABLET ORAL DAILY
Qty: 90 TABLET | Refills: 3 | Status: SHIPPED | OUTPATIENT
Start: 2023-05-24

## 2023-05-24 RX ORDER — PANTOPRAZOLE SODIUM 40 MG/1
40 TABLET, DELAYED RELEASE ORAL 2 TIMES DAILY
COMMUNITY

## 2023-05-24 RX ORDER — DILTIAZEM HYDROCHLORIDE 240 MG/1
240 CAPSULE, COATED, EXTENDED RELEASE ORAL DAILY
Qty: 30 CAPSULE | Refills: 3 | Status: SHIPPED | OUTPATIENT
Start: 2023-05-24

## 2023-05-24 NOTE — PROGRESS NOTES
Chief Complaint   Patient presents with   • Follow-up     Cardiac management . Will need clearance to hold Plavix for Upper  endoscopy    • Pacemaker Check     [employer] device check    • LABS     Had preop labs March 2023 .   • Med Refill     Needs refills on Plavix, Lipitor and Plavix 90 day supply to [facility] . She would like to discus lowering dose of Cardizem       Subjective       Miriam Power is a 42 y.o. female with hypertension, depression, recurrent headaches with associated partial complex seizure.  In 2020 cardiac catheterization showed normal coronaries with mild myocardial bridging, possible endothelial dysfunction, and low to normal PA pressure.  She developed right lower extremity arterial clot managed by vascular at .  Xarelto discontinued after normal scan.  Later in 2020 she was diagnosed with tachybradycardia syndrome and underwent placement of dual-chamber [employer] pacemaker.    In April 2023 cardiac clearance was given for L5-S1 surgery per Dr. Maier.    Today she returns to the office for follow-up visit.  From a cardiac standpoint she denies new or worsening symptoms.  She has had recent GI work-up revealing several ulcerations, most likely related to increased use of NSAIDs due to back pain.  Currently she is on Plavix but no longer taking aspirin.  She is requesting to hold Plavix for repeat GI work-up.    Cardiac History:    Past Surgical History:   Procedure Laterality Date   • BREAST IMPLANT SURGERY     • CARDIAC CATHETERIZATION  05/29/2020    Normal Coronaries, Normal EF. Low PA pressure   • CARDIOVASCULAR STRESS TEST  07/12/2018    5 Min, 11 secs, 7.0 METS. 83% THR. BP- 190/69. R/O Anterior Ischemia.   • CARDIOVASCULAR STRESS TEST  05/28/2019    5 Min, 53 Secs. 7.00 METS. 73% THR. BP- 142/55. EF > 70%. Breast attenuation/   • CONVERTED (HISTORICAL) HOLTER  03/24/2021    > 1 Day. Atrial paced. AVG 78.    • CONVERTED (HISTORICAL) HOLTER  07/01/2021    <3 days. AVG 95.     • ECHO - CONVERTED  11/15/2015    @[facility]. - EF 60%   • ECHO - CONVERTED  07/12/2018    EF 65%. No shunt   • ECHO - CONVERTED  05/28/2019    TLS. EF 65%. Mild MR.   • ECHO - CONVERTED  06/10/2021    EF 65%. Trace-Mild MR. RVSP- 14 mmHg   • TUBAL ABDOMINAL LIGATION         Current Outpatient Medications   Medication Sig Dispense Refill   • atorvastatin (LIPITOR) 10 MG tablet Take 1 tablet by mouth Daily. 90 tablet 3   • buPROPion SR (WELLBUTRIN SR) 150 MG 12 hr tablet Take 1 tablet by mouth Daily.  3   • clopidogrel (PLAVIX) 75 MG tablet Take 1 tablet by mouth Daily. 90 tablet 3   • Liraglutide (Saxenda) 18 MG/3ML injection pen Inject 3 mg under the skin into the appropriate area as directed Daily.     • metFORMIN (GLUCOPHAGE) 500 MG tablet Take 1 tablet by mouth Daily.     • metoprolol tartrate (LOPRESSOR) 25 MG tablet As needed for increased heart rate or blood pressure 180 tablet 3   • nitroglycerin (NITROSTAT) 0.4 MG SL tablet PLACE 1 TABLET UNDER TONGUE EVERY 5 MINS, UP TO 3 DOSES AS NEEDED FOR CHEST PAIN 25 tablet 1   • ondansetron (ZOFRAN) 4 MG tablet Take 1 tablet by mouth Every 8 (Eight) Hours As Needed for Nausea or Vomiting.     • pantoprazole (PROTONIX) 40 MG EC tablet Take 1 tablet by mouth 2 (Two) Times a Day.     • promethazine (PHENERGAN) 25 MG tablet Take 1 tablet by mouth Every 6 (Six) Hours As Needed for Nausea or Vomiting.     • QUEtiapine Fumarate 150 MG tablet Take 150 mg by mouth Every Night.     • rizatriptan (MAXALT) 10 MG tablet TAKE 1 TAB AS NEEDED FOR HEADACHE. MAY REPEAT 1 TAB WITHIN 2 HOURS. MAX 2 TABS/24HRS. NO OTHER TRIPT  5   • tiZANidine (ZANAFLEX) 4 MG tablet Take 1 tablet by mouth 2 (Two) Times a Day As Needed for Muscle Spasms.     • topiramate (TOPAMAX) 200 MG tablet Take 1 tablet by mouth 2 (Two) Times a Day.     • albuterol sulfate  (90 Base) MCG/ACT inhaler Inhale 2 puffs Every 6 (Six) Hours As Needed for Wheezing.     • Arginine 1000 MG tablet Take 1  tablet by mouth 2 (Two) Times a Day. As directed 180 each 3   • dilTIAZem CD (CARDIZEM CD) 240 MG 24 hr capsule Take 1 capsule by mouth Daily. 30 capsule 3   • ZOLPIDEM TARTRATE PO Take 12.5 mg by mouth At Night As Needed for Sleep.       No current facility-administered medications for this visit.       Contrast dye (echo or unknown ct/mr) and Labetalol    Past Medical History:   Diagnosis Date   • Frequent headaches    • History of suicide attempt    • History of tubal ligation    • Hypertension    • Hyperthyroidism    • Panic attack    • Seizures    • Trigeminal neuralgia    • Vitamin D deficiency        Social History     Socioeconomic History   • Marital status:    Tobacco Use   • Smoking status: Never   • Smokeless tobacco: Never   Vaping Use   • Vaping Use: Never used   Substance and Sexual Activity   • Alcohol use: No     Comment: 1 glass of red wine month   • Drug use: No   • Sexual activity: Defer       Family History   Problem Relation Age of Onset   • Hyperlipidemia Mother    • No Known Problems Father    • No Known Problems Brother        Review of Systems   Constitutional: Positive for decreased appetite (at times) and malaise/fatigue. Negative for diaphoresis and fever.   Eyes: Negative for visual disturbance.   Cardiovascular: Negative for chest pain, dyspnea on exertion, leg swelling, near-syncope and palpitations.   Respiratory: Positive for shortness of breath. Negative for sleep disturbances due to breathing.    Endocrine: Negative for polydipsia, polyphagia and polyuria.   Hematologic/Lymphatic: Positive for bleeding problem. Does not bruise/bleed easily.   Musculoskeletal: Positive for back pain and joint pain.   Gastrointestinal: Positive for bloating, abdominal pain and heartburn. Negative for dysphagia, hematemesis (not since GI workup), nausea and vomiting.   Genitourinary: Negative for dysuria and hematuria.   Neurological: Negative for dizziness, loss of balance, seizures (Followed  "by neurologist) and tremors.   Psychiatric/Behavioral: The patient is nervous/anxious. The patient does not have insomnia.    Allergic/Immunologic: Negative for persistent infections.        BP Readings from Last 5 Encounters:   05/24/23 110/64   11/22/22 118/60   09/23/21 100/60   03/11/21 136/84   09/11/20 110/60       Wt Readings from Last 5 Encounters:   05/24/23 69.9 kg (154 lb 3.2 oz)   11/22/22 65.9 kg (145 lb 3.2 oz)   09/23/21 76.5 kg (168 lb 9.6 oz)   06/10/21 85.7 kg (188 lb 15 oz)   03/11/21 85.7 kg (189 lb)       Objective     /64 (BP Location: Left arm, Patient Position: Sitting)   Pulse 82   Ht 149 cm (58.66\")   Wt 69.9 kg (154 lb 3.2 oz)   BMI 31.51 kg/m²     Vitals and nursing note reviewed.   Constitutional:       Appearance: Healthy appearance. Well-groomed and not in distress.   Eyes:      Conjunctiva/sclera: Conjunctivae normal.      Pupils: Pupils are equal, round, and reactive to light.   HENT:      Head: Normocephalic.   Pulmonary:      Effort: Pulmonary effort is normal.      Breath sounds: Normal breath sounds.   Cardiovascular:      PMI at left midclavicular line. Normal rate. Regular rhythm.      Murmurs: There is no murmur.   Pulses:     Intact distal pulses.   Edema:     Peripheral edema absent.   Abdominal:      General: Bowel sounds are normal.      Palpations: Abdomen is soft.   Musculoskeletal: Normal range of motion.      Cervical back: Normal range of motion and neck supple. Skin:     General: Skin is warm and dry.   Neurological:      Mental Status: Alert, oriented to person, place, and time and oriented to person, place and time.   Psychiatric:         Behavior: Behavior is cooperative.            ECG 12 Lead    Date/Time: 5/26/2023 8:44 AM  Performed by: Mariela Garcia APRN  Authorized by: Mariela Garcia APRN   Comparison: compared with previous ECG from 11/22/2022  Similar to previous ECG  Rhythm: sinus rhythm  BPM: 82                   Assessment & Plan "   Diagnoses and all orders for this visit:    1. Coronary-myocardial bridge (Primary)  -     clopidogrel (PLAVIX) 75 MG tablet; Take 1 tablet by mouth Daily.  Dispense: 90 tablet; Refill: 3    2. Essential hypertension  -     metoprolol tartrate (LOPRESSOR) 25 MG tablet; As needed for increased heart rate or blood pressure  Dispense: 180 tablet; Refill: 3    3. Hypercholesteremia  -     atorvastatin (LIPITOR) 10 MG tablet; Take 1 tablet by mouth Daily.  Dispense: 90 tablet; Refill: 3    4. Tachy-horacio syndrome  -     dilTIAZem CD (CARDIZEM CD) 240 MG 24 hr capsule; Take 1 capsule by mouth Daily.  Dispense: 30 capsule; Refill: 3    5. Presence of cardiac pacemaker  -     ECG 12 Lead    6. History of arterial thrombosis  -     clopidogrel (PLAVIX) 75 MG tablet; Take 1 tablet by mouth Daily.  Dispense: 90 tablet; Refill: 3    7. Palpitations  -     metoprolol tartrate (LOPRESSOR) 25 MG tablet; As needed for increased heart rate or blood pressure  Dispense: 180 tablet; Refill: 3  -     dilTIAZem CD (CARDIZEM CD) 240 MG 24 hr capsule; Take 1 capsule by mouth Daily.  Dispense: 30 capsule; Refill: 3        Myocardial bridge  - Currently asymptomatic  -Continue L-arginine and CCB.  Nitrostat as needed  -Given blood pressure remained stable and has not had increased heart rate patient asked if can resume previous dose CCB.  Cardizem 240 mg daily prescribed.     Hypertension  -BP well controlled  -Continue Cardizem, decrease 240 mg daily  -DASH diet      Hypercholesterolemia  -Continue Lipitor  -She will follow with you for lab orders and management.  Please forward copy next lab report.     Tachybradycardia syndrome/pacemaker  -EKG  sinus rhythm  -Continue Lopressor and Cardizem  - Pacemaker interrogation scheduled to be done in 6 months.    -Most recent showed normal function and adequate battery life.     Of note clearance was given to hold Plavix 5 to 7 days prior to GI work-up    A 6-month follow-up visit scheduled.   Please call sooner for cardiac concerns.

## 2023-05-24 NOTE — TELEPHONE ENCOUNTER
Per Office Staff patient would like to participate in remote monitoring. Enrolled patient in [employer]. And ordered cell adapter. Called and left message for patient.

## 2023-05-25 ENCOUNTER — TELEPHONE (OUTPATIENT)
Dept: CARDIOLOGY | Facility: CLINIC | Age: 43
End: 2023-05-25
Payer: COMMERCIAL

## 2023-05-26 PROCEDURE — 93000 ELECTROCARDIOGRAM COMPLETE: CPT | Performed by: NURSE PRACTITIONER

## 2023-10-06 DIAGNOSIS — R00.2 PALPITATIONS: ICD-10-CM

## 2023-10-06 DIAGNOSIS — I49.5 TACHY-BRADY SYNDROME: ICD-10-CM

## 2023-10-12 RX ORDER — DILTIAZEM HYDROCHLORIDE 240 MG/1
240 CAPSULE, COATED, EXTENDED RELEASE ORAL DAILY
Qty: 30 CAPSULE | Refills: 3 | Status: SHIPPED | OUTPATIENT
Start: 2023-10-12

## 2023-11-13 ENCOUNTER — TELEPHONE (OUTPATIENT)
Dept: CARDIOLOGY | Facility: CLINIC | Age: 43
End: 2023-11-13
Payer: COMMERCIAL

## 2023-11-13 DIAGNOSIS — R55 SYNCOPE AND COLLAPSE: ICD-10-CM

## 2023-11-13 DIAGNOSIS — I49.5 TACHY-BRADY SYNDROME: Primary | ICD-10-CM

## 2023-11-13 DIAGNOSIS — E87.6 HYPOKALEMIA: ICD-10-CM

## 2023-11-13 RX ORDER — NITROGLYCERIN 0.4 MG/1
0.4 TABLET SUBLINGUAL
Qty: 25 TABLET | Refills: 1 | Status: SHIPPED | OUTPATIENT
Start: 2023-11-13

## 2023-11-13 NOTE — TELEPHONE ENCOUNTER
Patient called in to report she has been experiencing Syncopal episodes  , myalgia and fatigue .  She had an URI and was treated by her PCP   Had ER visit 11- , labs and EKG done with recommendation to follow up with Cardiology .

## 2023-11-14 NOTE — TELEPHONE ENCOUNTER
I spoke with patient , she had labs 11-1-2023 at [facility] . We have obtained records from [facility] .  She is aware of scheduled appointment 11- for pacemaker check and  Echo.

## 2023-11-29 ENCOUNTER — CLINICAL SUPPORT NO REQUIREMENTS (OUTPATIENT)
Dept: CARDIOLOGY | Facility: CLINIC | Age: 43
End: 2023-11-29
Payer: COMMERCIAL

## 2023-11-29 DIAGNOSIS — R00.2 PALPITATIONS: Primary | ICD-10-CM

## 2023-11-29 DIAGNOSIS — I49.5 TACHY-BRADY SYNDROME: ICD-10-CM

## 2023-12-04 ENCOUNTER — HOSPITAL ENCOUNTER (OUTPATIENT)
Dept: CARDIOLOGY | Facility: HOSPITAL | Age: 43
Discharge: HOME | End: 2023-12-04
Payer: COMMERCIAL

## 2023-12-04 ENCOUNTER — LAB (OUTPATIENT)
Dept: LAB | Facility: HOSPITAL | Age: 43
End: 2023-12-04
Payer: COMMERCIAL

## 2023-12-04 VITALS — HEIGHT: 59 IN | BODY MASS INDEX: 31.07 KG/M2 | WEIGHT: 154.1 LBS

## 2023-12-04 DIAGNOSIS — E87.6 HYPOKALEMIA: ICD-10-CM

## 2023-12-04 DIAGNOSIS — R55 SYNCOPE AND COLLAPSE: ICD-10-CM

## 2023-12-04 DIAGNOSIS — I49.5 TACHY-BRADY SYNDROME: ICD-10-CM

## 2023-12-04 LAB
ALBUMIN SERPL-MCNC: 3.7 G/DL (ref 3.5–5.2)
ALBUMIN/GLOB SERPL: 1.4 G/DL
ALP SERPL-CCNC: 73 U/L (ref 39–117)
ALT SERPL W P-5'-P-CCNC: 29 U/L (ref 1–33)
ANION GAP SERPL CALCULATED.3IONS-SCNC: 15 MMOL/L (ref 5–15)
AST SERPL-CCNC: 50 U/L (ref 1–32)
BH CV ECHO MEAS - AO MAX PG: 4.4 MMHG
BH CV ECHO MEAS - AO MEAN PG: 2.6 MMHG
BH CV ECHO MEAS - AO ROOT DIAM: 2.9 CM
BH CV ECHO MEAS - AO V2 MAX: 105.3 CM/SEC
BH CV ECHO MEAS - AO V2 VTI: 20.4 CM
BH CV ECHO MEAS - EDV(CUBED): 82 ML
BH CV ECHO MEAS - EF(MOD-BP): 60 %
BH CV ECHO MEAS - ESV(CUBED): 25.3 ML
BH CV ECHO MEAS - FS: 32.4 %
BH CV ECHO MEAS - IVS/LVPW: 0.96 CM
BH CV ECHO MEAS - IVSD: 0.9 CM
BH CV ECHO MEAS - LA DIMENSION: 3.1 CM
BH CV ECHO MEAS - LAT PEAK E' VEL: 13.4 CM/SEC
BH CV ECHO MEAS - LV MASS(C)D: 129.5 GRAMS
BH CV ECHO MEAS - LV MAX PG: 4.1 MMHG
BH CV ECHO MEAS - LV MEAN PG: 2.07 MMHG
BH CV ECHO MEAS - LV V1 MAX: 100.6 CM/SEC
BH CV ECHO MEAS - LV V1 VTI: 18.6 CM
BH CV ECHO MEAS - LVIDD: 4.3 CM
BH CV ECHO MEAS - LVIDS: 2.9 CM
BH CV ECHO MEAS - LVPWD: 0.94 CM
BH CV ECHO MEAS - MED PEAK E' VEL: 8.9 CM/SEC
BH CV ECHO MEAS - MV A MAX VEL: 47.6 CM/SEC
BH CV ECHO MEAS - MV DEC SLOPE: 568.5 CM/SEC2
BH CV ECHO MEAS - MV DEC TIME: 0.21 SEC
BH CV ECHO MEAS - MV E MAX VEL: 55.8 CM/SEC
BH CV ECHO MEAS - MV E/A: 1.17
BH CV ECHO MEAS - MV MAX PG: 2.27 MMHG
BH CV ECHO MEAS - MV MEAN PG: 1.09 MMHG
BH CV ECHO MEAS - MV P1/2T: 41.8 MSEC
BH CV ECHO MEAS - MV V2 VTI: 13.8 CM
BH CV ECHO MEAS - MVA(P1/2T): 5.3 CM2
BH CV ECHO MEAS - PA V2 MAX: 70.5 CM/SEC
BH CV ECHO MEAS - RAP SYSTOLE: 8 MMHG
BH CV ECHO MEAS - RV MAX PG: 0.94 MMHG
BH CV ECHO MEAS - RV V1 MAX: 48.5 CM/SEC
BH CV ECHO MEAS - RV V1 VTI: 9.2 CM
BH CV ECHO MEAS - RVSP: 13.1 MMHG
BH CV ECHO MEAS - TAPSE (>1.6): 2.03 CM
BH CV ECHO MEAS - TR MAX PG: 5.1 MMHG
BH CV ECHO MEAS - TR MAX VEL: 113 CM/SEC
BH CV ECHO MEASUREMENTS AVERAGE E/E' RATIO: 5
BH CV XLRA - TDI S': 11.2 CM/SEC
BILIRUB SERPL-MCNC: 0.3 MG/DL (ref 0–1.2)
BUN SERPL-MCNC: 19 MG/DL (ref 6–20)
BUN/CREAT SERPL: 14.7 (ref 7–25)
CALCIUM SPEC-SCNC: 8.7 MG/DL (ref 8.6–10.5)
CHLORIDE SERPL-SCNC: 109 MMOL/L (ref 98–107)
CO2 SERPL-SCNC: 17 MMOL/L (ref 22–29)
CREAT SERPL-MCNC: 1.29 MG/DL (ref 0.57–1)
EGFRCR SERPLBLD CKD-EPI 2021: 53.3 ML/MIN/1.73
GLOBULIN UR ELPH-MCNC: 2.7 GM/DL
GLUCOSE SERPL-MCNC: 118 MG/DL (ref 65–99)
POTASSIUM SERPL-SCNC: 3.2 MMOL/L (ref 3.5–5.2)
PROT SERPL-MCNC: 6.4 G/DL (ref 6–8.5)
SODIUM SERPL-SCNC: 141 MMOL/L (ref 136–145)

## 2023-12-04 PROCEDURE — 93306 TTE W/DOPPLER COMPLETE: CPT | Performed by: INTERNAL MEDICINE

## 2023-12-04 PROCEDURE — 93306 TTE W/DOPPLER COMPLETE: CPT

## 2023-12-04 PROCEDURE — 80053 COMPREHEN METABOLIC PANEL: CPT | Performed by: NURSE PRACTITIONER

## 2023-12-04 RX ORDER — POTASSIUM CHLORIDE 20 MEQ/1
TABLET, EXTENDED RELEASE ORAL
Qty: 30 TABLET | Refills: 3 | Status: SHIPPED | OUTPATIENT
Start: 2023-12-04

## 2023-12-05 ENCOUNTER — TELEPHONE (OUTPATIENT)
Dept: CARDIOLOGY | Facility: CLINIC | Age: 43
End: 2023-12-05
Payer: COMMERCIAL

## 2023-12-05 DIAGNOSIS — R55 SYNCOPE AND COLLAPSE: Primary | ICD-10-CM

## 2023-12-06 ENCOUNTER — TELEPHONE (OUTPATIENT)
Dept: CARDIOLOGY | Facility: CLINIC | Age: 43
End: 2023-12-06
Payer: COMMERCIAL

## 2023-12-06 RX ORDER — NITROGLYCERIN 0.4 MG/1
0.4 TABLET SUBLINGUAL AS NEEDED
Qty: 25 TABLET | Refills: 1 | Status: SHIPPED | OUTPATIENT
Start: 2023-12-06

## 2023-12-06 NOTE — TELEPHONE ENCOUNTER
REQUEST FOR CARDIAC CLEARANCE    Caller name: Miriam Power     Phone Number: [phone]     Surgeon's name: UNKNOWN, GETTING THIS DONE AT [facility]     Type of planned surgery: MRI     Date of planned surgery: NO DATE YET, TRYING TO DO THIS TODAY/ TOMORROW     Type of anesthesia: N/A     Have you been experiencing chest pain or shortness of breath? NO     Is your doctor requesting for you to stop any of your medications prior to your surgery? NO     Where should we fax the clearance to? [phone]  BETH MORALES     PACEMAKER CLEARANCE FOR MRI

## 2023-12-08 DIAGNOSIS — Z79.899 MEDICATION MANAGEMENT: Primary | ICD-10-CM

## 2023-12-08 NOTE — TELEPHONE ENCOUNTER
I still have not received any MRI clearance from facility.  I have contacted [employer] rep to see if her device is MRI compatible.

## 2023-12-12 ENCOUNTER — TELEPHONE (OUTPATIENT)
Dept: CARDIOLOGY | Facility: CLINIC | Age: 43
End: 2023-12-12

## 2023-12-12 ENCOUNTER — OFFICE VISIT (OUTPATIENT)
Dept: CARDIOLOGY | Facility: CLINIC | Age: 43
End: 2023-12-12
Payer: COMMERCIAL

## 2023-12-12 VITALS
BODY MASS INDEX: 31.85 KG/M2 | DIASTOLIC BLOOD PRESSURE: 70 MMHG | HEIGHT: 59 IN | SYSTOLIC BLOOD PRESSURE: 120 MMHG | WEIGHT: 158 LBS | HEART RATE: 85 BPM

## 2023-12-12 DIAGNOSIS — Z95.0 PRESENCE OF CARDIAC PACEMAKER: ICD-10-CM

## 2023-12-12 DIAGNOSIS — E78.00 HYPERCHOLESTEREMIA: ICD-10-CM

## 2023-12-12 DIAGNOSIS — I10 ESSENTIAL HYPERTENSION: ICD-10-CM

## 2023-12-12 DIAGNOSIS — R55 SYNCOPE AND COLLAPSE: Primary | ICD-10-CM

## 2023-12-12 DIAGNOSIS — I49.5 TACHY-BRADY SYNDROME: ICD-10-CM

## 2023-12-12 DIAGNOSIS — R00.2 PALPITATIONS: ICD-10-CM

## 2023-12-12 DIAGNOSIS — Q24.5 CORONARY-MYOCARDIAL BRIDGE: ICD-10-CM

## 2023-12-12 DIAGNOSIS — Z86.718 HISTORY OF ARTERIAL THROMBOSIS: ICD-10-CM

## 2023-12-12 PROCEDURE — 99214 OFFICE O/P EST MOD 30 MIN: CPT | Performed by: NURSE PRACTITIONER

## 2023-12-12 RX ORDER — DILTIAZEM HYDROCHLORIDE 240 MG/1
240 CAPSULE, COATED, EXTENDED RELEASE ORAL DAILY
Qty: 90 CAPSULE | Refills: 3 | Status: SHIPPED | OUTPATIENT
Start: 2023-12-12

## 2023-12-12 NOTE — PROGRESS NOTES
Chief Complaint   Patient presents with    Follow-up     Cardiac management.had Syncopal episode 12-1-2023 , has injury to right foot and is in need for MRI foot and head.  Had pacer check 11- , her Pacemaker is MRI comp.    Pacemaker Check     [employer] device check 11- .     LABS     Had labs  , results on chart    Med Refill     Needs refills on diltiazem 90 day supply to [facility]       Miriam Power is a 42 y.o. female with HTN, recurrent headaches with associated partial complex seizure.  Cardiac catheterization 2020 showed normal coronaries with mild myocardial bridging, possible endothelial dysfunction and low to normal PA pressure.  She developed right LE arterial clot managed by vascular at .  Xarelto stopped after normal scan.  Later in 2020 she was diagnosed with tachybradycardia syndrome and underwent placement of dual-chamber [employer] pacemaker.    11/28/2023 [employer] pacemaker interrogation showed normal function, 7.8% atrial pacing with episodes SVT.  Battery life greater than 6.7 years noted.    Today she returns to the office for follow-up visit due to recent syncopal episodes, after 1 episode she injured her foot and is now needing MRI to evaluate extent of damage.  She is in a boot with limited activity.    Cardiac History:    Past Surgical History:   Procedure Laterality Date    BREAST IMPLANT SURGERY      CARDIAC CATHETERIZATION  05/29/2020    Normal Coronaries, Normal EF. Low PA pressure    CARDIOVASCULAR STRESS TEST  07/12/2018    5 Min, 11 secs, 7.0 METS. 83% THR. BP- 190/69. R/O Anterior Ischemia.    CARDIOVASCULAR STRESS TEST  05/28/2019    5 Min, 53 Secs. 7.00 METS. 73% THR. BP- 142/55. EF > 70%. Breast attenuation/    CONVERTED (HISTORICAL) HOLTER  03/24/2021    > 1 Day. Atrial paced. AVG 78.     CONVERTED (HISTORICAL) HOLTER  07/01/2021    <3 days. AVG 95.     ECHO - CONVERTED  11/15/2015    @[facility]. - EF 60%    ECHO -  CONVERTED  07/12/2018    EF 65%. No shunt    ECHO - CONVERTED  05/28/2019    TLS. EF 65%. Mild MR.    ECHO - CONVERTED  06/10/2021    EF 65%. Trace-Mild MR. RVSP- 14 mmHg    ECHO - CONVERTED  12/04/2023    EF 65%. Trace-Mild MR. RVSP- 14 mmHg    TUBAL ABDOMINAL LIGATION         Current Outpatient Medications   Medication Sig Dispense Refill    albuterol sulfate  (90 Base) MCG/ACT inhaler Inhale 2 puffs Every 6 (Six) Hours As Needed for Wheezing.      Arginine 1000 MG tablet Take 1 tablet by mouth 2 (Two) Times a Day. As directed 180 each 3    buPROPion SR (WELLBUTRIN SR) 150 MG 12 hr tablet Take 1 tablet by mouth Daily.  3    clopidogrel (PLAVIX) 75 MG tablet Take 1 tablet by mouth Daily. 90 tablet 3    dilTIAZem CD (CARDIZEM CD) 240 MG 24 hr capsule Take 1 capsule by mouth Daily. 90 capsule 3    Liraglutide (Saxenda) 18 MG/3ML injection pen Inject 3 mg under the skin into the appropriate area as directed Daily.      metFORMIN (GLUCOPHAGE) 500 MG tablet Take 1 tablet by mouth Daily.      metoprolol tartrate (LOPRESSOR) 25 MG tablet As needed for increased heart rate or blood pressure 180 tablet 3    nitroglycerin (NITROSTAT) 0.4 MG SL tablet DISSOLVE 1 TAB UNDER TONGUE FOR CHEST PAIN - IF PAIN REMAINS AFTER 5 MIN, CALL 911 AND REPEAT DOSE. MAX 3 TABS IN 15 MINUTES 25 tablet 1    ondansetron (ZOFRAN) 4 MG tablet Take 1 tablet by mouth Every 8 (Eight) Hours As Needed for Nausea or Vomiting.      pantoprazole (PROTONIX) 40 MG EC tablet Take 1 tablet by mouth 2 (Two) Times a Day.      potassium chloride (K-DUR,KLOR-CON) 20 MEQ CR tablet Take two tablets twice daily for 3 days then once daily 30 tablet 3    promethazine (PHENERGAN) 25 MG tablet Take 1 tablet by mouth Every 6 (Six) Hours As Needed for Nausea or Vomiting.      QUEtiapine (SEROquel) 50 MG tablet Take 1 tablet by mouth Every Night.      rizatriptan (MAXALT) 10 MG tablet TAKE 1 TAB AS NEEDED FOR HEADACHE. MAY REPEAT 1 TAB WITHIN 2 HOURS. MAX 2  TABS/24HRS. NO OTHER TRIPT  5    tiZANidine (ZANAFLEX) 4 MG tablet Take 1 tablet by mouth 2 (Two) Times a Day As Needed for Muscle Spasms.      topiramate (TOPAMAX) 200 MG tablet Take 1 tablet by mouth 2 (Two) Times a Day.       No current facility-administered medications for this visit.       Contrast dye (echo or unknown ct/mr) and Labetalol    Past Medical History:   Diagnosis Date    Frequent headaches     History of suicide attempt     History of tubal ligation     Hypertension     Hyperthyroidism     Panic attack     Seizures     Trigeminal neuralgia     Vitamin D deficiency        Social History     Socioeconomic History    Marital status:    Tobacco Use    Smoking status: Never    Smokeless tobacco: Never   Vaping Use    Vaping Use: Never used   Substance and Sexual Activity    Alcohol use: No     Comment: 1 glass of red wine month    Drug use: No    Sexual activity: Defer       Family History   Problem Relation Age of Onset    Hyperlipidemia Mother     No Known Problems Father     No Known Problems Brother        Review of Systems   Cardiovascular:  Positive for syncope. Negative for chest pain.   Endocrine: Negative for polydipsia, polyphagia and polyuria.   Musculoskeletal:  Positive for falls.   Gastrointestinal:  Negative for change in bowel habit.   Genitourinary:  Negative for hematuria.   Neurological:  Positive for dizziness, light-headedness, loss of balance and weakness. Negative for seizures (unsure if experiencing a type of seizure).   Psychiatric/Behavioral:  Negative for memory loss. The patient is nervous/anxious.         BP Readings from Last 5 Encounters:   12/12/23 120/70   05/24/23 110/64   11/22/22 118/60   09/23/21 100/60   03/11/21 136/84       Wt Readings from Last 5 Encounters:   12/12/23 71.7 kg (158 lb)   12/04/23 69.9 kg (154 lb 1.6 oz)   05/24/23 69.9 kg (154 lb 3.2 oz)   11/22/22 65.9 kg (145 lb 3.2 oz)   09/23/21 76.5 kg (168 lb 9.6 oz)       Objective     Labs  "11/11/2023: WBC 5.88, RBC 4.3, H&H 12 and 38, platelets 283, D-dimer normal at 0.38, troponin negative.  Sodium 143, potassium low at 3.3, chloride 112, CO2 23, glucose 92, BUN 14, creatinine 1.35, GFR 50, total protein 6.6, albumin 4, calcium 9.1, total bili 0.2, AST 14, ALT 11, ALP 64, mag 1.9    /70 (BP Location: Left arm, Patient Position: Sitting)   Pulse 85   Ht 149 cm (58.66\")   Wt 71.7 kg (158 lb)   BMI 32.28 kg/m²     Vitals and nursing note reviewed.   Constitutional:       Appearance: Not in distress.   Eyes:      Conjunctiva/sclera: Conjunctivae normal.      Pupils: Pupils are equal, round, and reactive to light.   HENT:      Head: Normocephalic.   Pulmonary:      Effort: Pulmonary effort is normal.      Breath sounds: Normal breath sounds.   Cardiovascular:      PMI at left midclavicular line. Normal rate. Regular rhythm.      Murmurs: There is no murmur.   Edema:     Peripheral edema absent.   Abdominal:      General: Bowel sounds are normal.      Palpations: Abdomen is soft.   Musculoskeletal:      Cervical back: Normal range of motion and neck supple. Skin:     General: Skin is warm and dry.   Neurological:      Mental Status: Alert, oriented to person, place, and time and oriented to person, place and time.            ECG 12 Lead    Date/Time: 12/15/2023 9:10 AM  Performed by: Mariela Garcia APRN    Authorized by: Mariela Garcia APRN  Comparison: compared with previous ECG from 11/10/2023  Similar to previous ECG  Rhythm: sinus rhythm  BPM: 85               Assessment & Plan     Syncope and collapse  -Labs revealed hypokalemia, replaced  -Tilt table test ordered, scheduled for February    Coronary myocardial bridge  -Continue L-arginine, CCB    Hypertension  -Continue current dose Cardizem  -Good hydration encouraged    Hypercholesterolemia  -Continue Lipitor    Tachybradycardia syndrome/pacemaker  -EKG NSR  -Continue remote pacemaker interrogation, most recent normal    History " seizure disorder  -Patient plans to follow-up with neurologist in regards    6-month follow-up visit scheduled.

## 2023-12-15 PROCEDURE — 93000 ELECTROCARDIOGRAM COMPLETE: CPT | Performed by: NURSE PRACTITIONER

## 2024-02-02 ENCOUNTER — OUTSIDE FACILITY SERVICE (OUTPATIENT)
Dept: CARDIOLOGY | Facility: CLINIC | Age: 44
End: 2024-02-02
Payer: COMMERCIAL

## 2024-02-02 DIAGNOSIS — R55 SYNCOPE AND COLLAPSE: ICD-10-CM

## 2024-02-16 RX ORDER — NITROGLYCERIN 0.4 MG/1
0.4 TABLET SUBLINGUAL AS NEEDED
Qty: 25 TABLET | Refills: 1 | Status: SHIPPED | OUTPATIENT
Start: 2024-02-16

## 2024-03-19 RX ORDER — NITROGLYCERIN 0.4 MG/1
0.4 TABLET SUBLINGUAL AS NEEDED
Qty: 25 TABLET | Refills: 1 | Status: SHIPPED | OUTPATIENT
Start: 2024-03-19

## 2024-05-24 DIAGNOSIS — E78.00 HYPERCHOLESTEREMIA: ICD-10-CM

## 2024-05-28 DIAGNOSIS — Z79.899 MEDICATION MANAGEMENT: ICD-10-CM

## 2024-05-28 DIAGNOSIS — R00.2 PALPITATIONS: Primary | ICD-10-CM

## 2024-05-28 RX ORDER — ATORVASTATIN CALCIUM 10 MG/1
10 TABLET, FILM COATED ORAL DAILY
Qty: 90 TABLET | Refills: 3 | OUTPATIENT
Start: 2024-05-28

## 2024-06-24 ENCOUNTER — LAB (OUTPATIENT)
Dept: LAB | Facility: HOSPITAL | Age: 44
End: 2024-06-24
Payer: COMMERCIAL

## 2024-06-24 DIAGNOSIS — R00.2 PALPITATIONS: ICD-10-CM

## 2024-06-24 DIAGNOSIS — Z79.899 MEDICATION MANAGEMENT: ICD-10-CM

## 2024-06-24 LAB
ALBUMIN SERPL-MCNC: 4.3 G/DL (ref 3.5–5.2)
ALBUMIN/GLOB SERPL: 1.3 G/DL
ALP SERPL-CCNC: 78 U/L (ref 39–117)
ALT SERPL W P-5'-P-CCNC: 19 U/L (ref 1–33)
ANION GAP SERPL CALCULATED.3IONS-SCNC: 10.5 MMOL/L (ref 5–15)
AST SERPL-CCNC: 17 U/L (ref 1–32)
BILIRUB SERPL-MCNC: 0.3 MG/DL (ref 0–1.2)
BUN SERPL-MCNC: 12 MG/DL (ref 6–20)
BUN/CREAT SERPL: 8.3 (ref 7–25)
CALCIUM SPEC-SCNC: 9.2 MG/DL (ref 8.6–10.5)
CHLORIDE SERPL-SCNC: 106 MMOL/L (ref 98–107)
CO2 SERPL-SCNC: 21.5 MMOL/L (ref 22–29)
CREAT SERPL-MCNC: 1.45 MG/DL (ref 0.57–1)
EGFRCR SERPLBLD CKD-EPI 2021: 46 ML/MIN/1.73
GLOBULIN UR ELPH-MCNC: 3.2 GM/DL
GLUCOSE SERPL-MCNC: 87 MG/DL (ref 65–99)
MAGNESIUM SERPL-MCNC: 2.2 MG/DL (ref 1.6–2.6)
POTASSIUM SERPL-SCNC: 4.1 MMOL/L (ref 3.5–5.2)
PROT SERPL-MCNC: 7.5 G/DL (ref 6–8.5)
SODIUM SERPL-SCNC: 138 MMOL/L (ref 136–145)

## 2024-06-24 PROCEDURE — 36415 COLL VENOUS BLD VENIPUNCTURE: CPT

## 2024-06-24 PROCEDURE — 83735 ASSAY OF MAGNESIUM: CPT

## 2024-06-24 PROCEDURE — 80053 COMPREHEN METABOLIC PANEL: CPT

## 2024-06-25 ENCOUNTER — OFFICE VISIT (OUTPATIENT)
Dept: CARDIOLOGY | Facility: CLINIC | Age: 44
End: 2024-06-25
Payer: COMMERCIAL

## 2024-06-25 VITALS
DIASTOLIC BLOOD PRESSURE: 58 MMHG | HEIGHT: 59 IN | WEIGHT: 140 LBS | SYSTOLIC BLOOD PRESSURE: 100 MMHG | BODY MASS INDEX: 28.22 KG/M2 | HEART RATE: 93 BPM

## 2024-06-25 DIAGNOSIS — I95.1 ORTHOSTATIC HYPOTENSION: ICD-10-CM

## 2024-06-25 DIAGNOSIS — Q24.5 CORONARY-MYOCARDIAL BRIDGE: Primary | ICD-10-CM

## 2024-06-25 DIAGNOSIS — I49.5 TACHY-BRADY SYNDROME: ICD-10-CM

## 2024-06-25 DIAGNOSIS — R00.2 PALPITATIONS: ICD-10-CM

## 2024-06-25 DIAGNOSIS — I10 ESSENTIAL HYPERTENSION: ICD-10-CM

## 2024-06-25 DIAGNOSIS — Z95.0 PRESENCE OF CARDIAC PACEMAKER: ICD-10-CM

## 2024-06-25 DIAGNOSIS — Z86.718 HISTORY OF ARTERIAL THROMBOSIS: ICD-10-CM

## 2024-06-25 PROCEDURE — 99214 OFFICE O/P EST MOD 30 MIN: CPT | Performed by: NURSE PRACTITIONER

## 2024-06-25 PROCEDURE — 93000 ELECTROCARDIOGRAM COMPLETE: CPT | Performed by: NURSE PRACTITIONER

## 2024-06-25 RX ORDER — PAROXETINE HYDROCHLORIDE 20 MG/1
20 TABLET, FILM COATED ORAL EVERY MORNING
COMMUNITY

## 2024-06-25 RX ORDER — GABAPENTIN 300 MG/1
300 CAPSULE ORAL 3 TIMES DAILY
COMMUNITY

## 2024-06-25 RX ORDER — FLUDROCORTISONE ACETATE 0.1 MG/1
TABLET ORAL
Qty: 60 TABLET | Refills: 6 | Status: SHIPPED | OUTPATIENT
Start: 2024-06-25

## 2024-06-25 RX ORDER — CLOPIDOGREL BISULFATE 75 MG/1
75 TABLET ORAL DAILY
Qty: 90 TABLET | Refills: 3 | Status: SHIPPED | OUTPATIENT
Start: 2024-06-25

## 2024-06-25 NOTE — PROGRESS NOTES
Chief Complaint   Patient presents with    Follow-up     Cardiac management    Pacemaker Check     [employer]rell remote check    Hypotension     Patient  has been experiencing orthostatic hypotension causing falls. Her BP drops  to 70/40 .  She continues to follow with Neurology in [place]    LABS     Current labs 6- in chart.  Is scheduled for MRI head/ neck at  hospital    Med Refill     Needs refills on Plavix and metoprolol 90 day supply to [facility]       Miriam Power is a 43 y.o. female with HTN, recurrent headaches with associated partial complex seizure. Cardiac catheterization 2020 showed normal coronaries with mild myocardial bridging, possible endothelial dysfunction and low to normal PA pressure. She developed right LE arterial clot managed by vascular at . Xarelto stopped after normal scan. Later in 2020 she was diagnosed with tachybradycardia syndrome and underwent placement of dual-chamber [employer] pacemaker.     2/2/2024 tilt table showed significant postural hypotension, symptomatic.  Cardizem stopped.  Plan was to add Florinef if symptoms persisted, consider beta-blocker.    Today she returns to the office for a follow-up visit.  From a neurological standpoint medication changes noted.  Patient admits to persistent issues with lightheadedness and near syncope, sometimes causing her to fall.  This morning at home her blood pressure was 90 systolic and after standing dropped to 70.  She is no longer driving due to seizure activity and following closely with neurologist.  MRI of head and neck scheduled to be done at  in the near future.    Cardiac History:    Past Surgical History:   Procedure Laterality Date    BREAST IMPLANT SURGERY      CARDIAC CATHETERIZATION  05/29/2020    Normal Coronaries, Normal EF. Low PA pressure    CARDIOVASCULAR STRESS TEST  07/12/2018    5 Min, 11 secs, 7.0 METS. 83% THR. BP- 190/69. R/O Anterior Ischemia.    CARDIOVASCULAR STRESS TEST   05/28/2019    5 Min, 53 Secs. 7.00 METS. 73% THR. BP- 142/55. EF > 70%. Breast attenuation/    CONVERTED (HISTORICAL) HOLTER  03/24/2021    > 1 Day. Atrial paced. AVG 78.     CONVERTED (HISTORICAL) HOLTER  07/01/2021    <3 days. AVG 95.     ECHO - CONVERTED  11/15/2015    @[facility]. - EF 60%    ECHO - CONVERTED  07/12/2018    EF 65%. No shunt    ECHO - CONVERTED  05/28/2019    TLS. EF 65%. Mild MR.    ECHO - CONVERTED  06/10/2021    EF 65%. Trace-Mild MR. RVSP- 14 mmHg    ECHO - CONVERTED  12/04/2023    EF 65%. Trace-Mild MR. RVSP- 14 mmHg    OTHER SURGICAL HISTORY  02/02/2024    Tilt Table- Positive for Postural Hypotension    TUBAL ABDOMINAL LIGATION         Current Outpatient Medications   Medication Sig Dispense Refill    albuterol sulfate  (90 Base) MCG/ACT inhaler Inhale 2 puffs Every 6 (Six) Hours As Needed for Wheezing.      Arginine 1000 MG tablet Take 1 tablet by mouth 2 (Two) Times a Day. As directed 180 each 3    buPROPion SR (WELLBUTRIN SR) 150 MG 12 hr tablet Take 1 tablet by mouth Daily.  3    clopidogrel (PLAVIX) 75 MG tablet Take 1 tablet by mouth Daily. 90 tablet 3    gabapentin (NEURONTIN) 300 MG capsule Take 1 capsule by mouth 3 (Three) Times a Day.      metoprolol tartrate (LOPRESSOR) 25 MG tablet Take 1 tablet by mouth 2 (Two) Times a Day. 180 tablet 3    nitroglycerin (NITROSTAT) 0.4 MG SL tablet DISSOLVE 1 TAB UNDER TONGUE FOR CHEST PAIN - IF PAIN REMAINS AFTER 5 MIN, CALL 911 AND REPEAT DOSE. MAX 3 TABS IN 15 MINUTES 25 tablet 1    ondansetron (ZOFRAN) 4 MG tablet Take 1 tablet by mouth Every 8 (Eight) Hours As Needed for Nausea or Vomiting.      pantoprazole (PROTONIX) 40 MG EC tablet Take 1 tablet by mouth 2 (Two) Times a Day.      promethazine (PHENERGAN) 25 MG tablet Take 1 tablet by mouth Every 6 (Six) Hours As Needed for Nausea or Vomiting.      QUEtiapine (SEROquel) 100 MG tablet Take 1 tablet by mouth Every Night.      rizatriptan (MAXALT) 10 MG tablet TAKE  1 TAB AS NEEDED FOR HEADACHE. MAY REPEAT 1 TAB WITHIN 2 HOURS. MAX 2 TABS/24HRS. NO OTHER TRIPT  5    tiZANidine (ZANAFLEX) 4 MG tablet Take 1 tablet by mouth 2 (Two) Times a Day As Needed for Muscle Spasms.      topiramate (TOPAMAX) 200 MG tablet Take 1 tablet by mouth 2 (Two) Times a Day.      fludrocortisone 0.1 MG tablet Take once a day. Can take extra dose once a day if needed for low blood pressure 60 tablet 6    PARoxetine (PAXIL) 20 MG tablet Take 1 tablet by mouth Every Morning.       No current facility-administered medications for this visit.       Contrast dye (echo or unknown ct/mr) and Labetalol    Past Medical History:   Diagnosis Date    Frequent headaches     History of suicide attempt     History of tubal ligation     Hypertension     Hyperthyroidism     Panic attack     Seizures     Trigeminal neuralgia     Vitamin D deficiency        Social History     Socioeconomic History    Marital status:    Tobacco Use    Smoking status: Never    Smokeless tobacco: Never   Vaping Use    Vaping status: Never Used   Substance and Sexual Activity    Alcohol use: No     Comment: 1 glass of red wine month    Drug use: No    Sexual activity: Defer       Family History   Problem Relation Age of Onset    Hyperlipidemia Mother     No Known Problems Father     No Known Problems Brother        Review of Systems   Constitutional: Positive for malaise/fatigue.   Cardiovascular:  Positive for near-syncope. Negative for chest pain, leg swelling and palpitations.   Hematologic/Lymphatic: Negative for bleeding problem.   Neurological:  Positive for dizziness, headaches, light-headedness, seizures and weakness. Negative for tremors.        BP Readings from Last 5 Encounters:   06/25/24 100/58   12/12/23 120/70   05/24/23 110/64   11/22/22 118/60   09/23/21 100/60       Wt Readings from Last 5 Encounters:   06/25/24 63.5 kg (140 lb)   12/12/23 71.7 kg (158 lb)   12/04/23 69.9 kg (154 lb 1.6 oz)   05/24/23 69.9 kg (154  "lb 3.2 oz)   11/22/22 65.9 kg (145 lb 3.2 oz)       Objective     /58 (BP Location: Left arm, Patient Position: Sitting)   Pulse 93   Ht 149 cm (58.66\")   Wt 63.5 kg (140 lb)   BMI 28.61 kg/m²     Vitals and nursing note reviewed.   Constitutional:       Appearance: Not in distress.   Pulmonary:      Effort: Pulmonary effort is normal.      Breath sounds: Normal breath sounds.   Cardiovascular:      PMI at left midclavicular line. Normal rate. Regular rhythm.      Murmurs: There is no murmur.   Pulses:     Intact distal pulses.   Edema:     Peripheral edema absent.   Abdominal:      General: Bowel sounds are normal. There is no distension.      Palpations: Abdomen is soft.   Skin:     General: Skin is warm and dry.   Neurological:      Mental Status: Alert.            ECG 12 Lead    Date/Time: 6/25/2024 4:23 PM  Performed by: Mariela Garcia APRN    Authorized by: Mariela Garcia APRN  Comparison: compared with previous ECG from 12/15/2023  Similar to previous ECG  Rhythm: sinus rhythm  BPM: 93               Assessment & Plan   Diagnoses and all orders for this visit:    1. Coronary-myocardial bridge (Primary)  -     clopidogrel (PLAVIX) 75 MG tablet; Take 1 tablet by mouth Daily.  Dispense: 90 tablet; Refill: 3    2. Essential hypertension  -     metoprolol tartrate (LOPRESSOR) 25 MG tablet; Take 1 tablet by mouth 2 (Two) Times a Day.  Dispense: 180 tablet; Refill: 3    3. Tachy-horacio syndrome    4. Presence of cardiac pacemaker  -     ECG 12 Lead    5. Orthostatic hypotension  -     fludrocortisone 0.1 MG tablet; Take once a day. Can take extra dose once a day if needed for low blood pressure  Dispense: 60 tablet; Refill: 6    6. Palpitations  -     metoprolol tartrate (LOPRESSOR) 25 MG tablet; Take 1 tablet by mouth 2 (Two) Times a Day.  Dispense: 180 tablet; Refill: 3    7. History of arterial thrombosis  -     clopidogrel (PLAVIX) 75 MG tablet; Take 1 tablet by mouth Daily.  Dispense: 90 tablet; " Refill: 3      Orthostatic hypotension/near syncope  -Tilt table test results reviewed, severe orthostatic hypotension  -Continue beta-blocker  -Add fludrocortisone  -Continue hydration.  Consider compression stockings.  Discussed prevention of prolonged sitting or standing.    Coronary myocardial bridge  -Continue L-arginine  -Denies chest pain  -No longer taking CCB due to hypotension      Tachybradycardia syndrome/pacemaker  -EKG NSR  -Continue remote pacemaker interrogation, most recent normal  -Will have interrogation prior to MRI near future.  -Currently denies palpitations.     History seizure disorder  -Patient plans to follow-up with neurologist in regards     6-month follow-up visit scheduled.                     Electronically signed by CARLOS Garcia,  June 25, 2024 16:33 EDT    Dictated Utilizing Dragon Dictation: Part of this note may be an electronic transcription/translation of spoken language to printed text using the Dragon Dictation System.

## 2025-02-12 ENCOUNTER — TELEPHONE (OUTPATIENT)
Dept: CARDIOLOGY | Facility: CLINIC | Age: 45
End: 2025-02-12
Payer: COMMERCIAL

## 2025-02-12 NOTE — TELEPHONE ENCOUNTER
Disconnected monitor. Last transmission was 12/14/2024. Called patient, left message to send manual transmission.

## 2025-02-24 ENCOUNTER — TELEPHONE (OUTPATIENT)
Dept: CARDIOLOGY | Facility: CLINIC | Age: 45
End: 2025-02-24
Payer: COMMERCIAL

## 2025-02-24 NOTE — LETTER
Miriam Power  [address] [facility] [place] [address]          Mrs. Power,    We have tried to contact you at phone number [phone] and [phone] regarding Dr. Alfred's recommendations based on your remote monitoring transmission findings.      Please contact our office at [phone], ext. [phone] to discuss findings and recommendations.        Sincerely,  April DIANE

## 2025-02-24 NOTE — TELEPHONE ENCOUNTER
Multiple episodes of Atrial tachycardia recorded as Mode Swtiches and Ventricular High Rates over the weekend. Longest duration 33 minutes, avg v-rate 150bpm to 160 bpm. Called patient to review meds and check for symptoms,  left message for return call. Report is in [facility] for review.

## 2025-02-25 NOTE — TELEPHONE ENCOUNTER
No answer, left VM to return my call.   oriented to person, place and time , normal sensation , short and long term memory intact

## 2025-02-27 NOTE — TELEPHONE ENCOUNTER
02/27/2025 remote shows 4 more VHR rates that are AT at a rate of 150 bpm. Longest duration 28 minutes. Called patient and left message for return call.

## 2025-06-17 ENCOUNTER — OFFICE VISIT (OUTPATIENT)
Dept: CARDIOLOGY | Facility: CLINIC | Age: 45
End: 2025-06-17
Payer: COMMERCIAL

## 2025-06-17 ENCOUNTER — LAB (OUTPATIENT)
Dept: LAB | Facility: HOSPITAL | Age: 45
End: 2025-06-17
Payer: COMMERCIAL

## 2025-06-17 VITALS
DIASTOLIC BLOOD PRESSURE: 62 MMHG | SYSTOLIC BLOOD PRESSURE: 110 MMHG | HEART RATE: 86 BPM | BODY MASS INDEX: 28.1 KG/M2 | HEIGHT: 59 IN | WEIGHT: 139.4 LBS

## 2025-06-17 DIAGNOSIS — R00.2 PALPITATIONS: ICD-10-CM

## 2025-06-17 DIAGNOSIS — R07.89 OTHER CHEST PAIN: ICD-10-CM

## 2025-06-17 DIAGNOSIS — I10 ESSENTIAL HYPERTENSION: ICD-10-CM

## 2025-06-17 DIAGNOSIS — R07.89 OTHER CHEST PAIN: Primary | ICD-10-CM

## 2025-06-17 DIAGNOSIS — Z95.0 PRESENCE OF CARDIAC PACEMAKER: ICD-10-CM

## 2025-06-17 DIAGNOSIS — Z86.718 HISTORY OF ARTERIAL THROMBOSIS: ICD-10-CM

## 2025-06-17 DIAGNOSIS — I49.5 TACHY-BRADY SYNDROME: ICD-10-CM

## 2025-06-17 DIAGNOSIS — Q24.5 CORONARY-MYOCARDIAL BRIDGE: ICD-10-CM

## 2025-06-17 DIAGNOSIS — E88.810 METABOLIC SYNDROME: ICD-10-CM

## 2025-06-17 LAB
ALBUMIN SERPL-MCNC: 4.2 G/DL (ref 3.5–5.2)
ALBUMIN/GLOB SERPL: 1.7 G/DL
ALP SERPL-CCNC: 61 U/L (ref 39–117)
ALT SERPL W P-5'-P-CCNC: 11 U/L (ref 1–33)
ANION GAP SERPL CALCULATED.3IONS-SCNC: 9.1 MMOL/L (ref 5–15)
AST SERPL-CCNC: 19 U/L (ref 1–32)
BILIRUB SERPL-MCNC: 0.2 MG/DL (ref 0–1.2)
BUN SERPL-MCNC: 15.6 MG/DL (ref 6–20)
BUN/CREAT SERPL: 13.4 (ref 7–25)
CALCIUM SPEC-SCNC: 8.6 MG/DL (ref 8.6–10.5)
CHLORIDE SERPL-SCNC: 105 MMOL/L (ref 98–107)
CO2 SERPL-SCNC: 24.9 MMOL/L (ref 22–29)
CREAT SERPL-MCNC: 1.16 MG/DL (ref 0.57–1)
EGFRCR SERPLBLD CKD-EPI 2021: 59.7 ML/MIN/1.73
GLOBULIN UR ELPH-MCNC: 2.5 GM/DL
GLUCOSE SERPL-MCNC: 84 MG/DL (ref 65–99)
MAGNESIUM SERPL-MCNC: 1.9 MG/DL (ref 1.6–2.6)
POTASSIUM SERPL-SCNC: 4.3 MMOL/L (ref 3.5–5.2)
PROT SERPL-MCNC: 6.7 G/DL (ref 6–8.5)
SODIUM SERPL-SCNC: 139 MMOL/L (ref 136–145)
TSH SERPL DL<=0.05 MIU/L-ACNC: 0.95 UIU/ML (ref 0.27–4.2)

## 2025-06-17 PROCEDURE — 80053 COMPREHEN METABOLIC PANEL: CPT

## 2025-06-17 PROCEDURE — 83735 ASSAY OF MAGNESIUM: CPT

## 2025-06-17 PROCEDURE — 36415 COLL VENOUS BLD VENIPUNCTURE: CPT

## 2025-06-17 PROCEDURE — 99214 OFFICE O/P EST MOD 30 MIN: CPT | Performed by: NURSE PRACTITIONER

## 2025-06-17 PROCEDURE — 84443 ASSAY THYROID STIM HORMONE: CPT

## 2025-06-17 RX ORDER — DULOXETIN HYDROCHLORIDE 30 MG/1
30 CAPSULE, DELAYED RELEASE ORAL DAILY
COMMUNITY

## 2025-06-17 RX ORDER — CLOPIDOGREL BISULFATE 75 MG/1
75 TABLET ORAL DAILY
Qty: 90 TABLET | Refills: 3 | Status: SHIPPED | OUTPATIENT
Start: 2025-06-17

## 2025-06-17 RX ORDER — METOPROLOL TARTRATE 25 MG/1
25 TABLET, FILM COATED ORAL 2 TIMES DAILY
Qty: 180 TABLET | Refills: 2 | Status: SHIPPED | OUTPATIENT
Start: 2025-06-17

## 2025-06-17 RX ORDER — DULOXETIN HYDROCHLORIDE 60 MG/1
60 CAPSULE, DELAYED RELEASE ORAL DAILY
COMMUNITY

## 2025-06-17 NOTE — PROGRESS NOTES
"Chief Complaint   Patient presents with    Follow-up     Cardiac management    Pacemaker Check     [employer]rell remote check June 8,2025    Chest Pain     Patient states she had an episode last week with pain in left jaw  and down  her left arm. She had taken Nitro with relief .  She also feels palpitations  at times    LABS     No current labs,  has follow up with PCP today and will have labs    Med Refill     Needs refills 90 day supply to [facility]       Miriam Power is a 44 y.o. female with HTN, recurrent headaches with associated partial complex seizure (followed by neurology at ). Cardiac catheterization 2020 showed normal coronaries with mild myocardial bridging, possible endothelial dysfunction and low to normal PA pressure. She developed right LE arterial clot managed by vascular at . Xarelto stopped after normal scan. Later in 2020 she was diagnosed with tachybradycardia syndrome and underwent placement of dual-chamber [employer]rell pacemaker.      2/2/2024 tilt table showed significant postural hypotension, symptomatic.  Cardizem stopped. Florinef later added due to persistent symptoms.    6/8/2025 [employer]rell PPM interrogation 4% atrial and 1% ventricular pacing, AT/AF burden 1%, battery longevity 6 years and 1 month.    Today she returns to the office for a follow-up visit.  She admits she had been \"feeling good\" until last week she experienced an episode of pain in left jaw and left arm as well as chest area.  The symptoms improved after taking Nitrostat.  She has had more issues with back pain and occasionally feeling heart palpitations.  Blood pressure remains normal and has not had to take fludrocortisone.    Cardiac History:    Past Surgical History:   Procedure Laterality Date    BREAST IMPLANT SURGERY      CARDIAC CATHETERIZATION  05/29/2020    Normal Coronaries, Normal EF. Low PA pressure    CARDIOVASCULAR STRESS TEST  07/12/2018    5 Min, 11 secs, 7.0 METS. 83% THR. BP- 190/69. R/O " Anterior Ischemia.    CARDIOVASCULAR STRESS TEST  05/28/2019    5 Min, 53 Secs. 7.00 METS. 73% THR. BP- 142/55. EF > 70%. Breast attenuation/    CONVERTED (HISTORICAL) HOLTER  03/24/2021    > 1 Day. Atrial paced. AVG 78.     CONVERTED (HISTORICAL) HOLTER  07/01/2021    <3 days. AVG 95.     ECHO - CONVERTED  11/15/2015    @[facility]. - EF 60%    ECHO - CONVERTED  07/12/2018    EF 65%. No shunt    ECHO - CONVERTED  05/28/2019    TLS. EF 65%. Mild MR.    ECHO - CONVERTED  06/10/2021    EF 65%. Trace-Mild MR. RVSP- 14 mmHg    ECHO - CONVERTED  12/04/2023    EF 65%. Trace-Mild MR. RVSP- 14 mmHg    OTHER SURGICAL HISTORY  02/02/2024    Tilt Table- Positive for Postural Hypotension    TUBAL ABDOMINAL LIGATION         Current Outpatient Medications   Medication Sig Dispense Refill    albuterol sulfate  (90 Base) MCG/ACT inhaler Inhale 2 puffs Every 6 (Six) Hours As Needed for Wheezing.      Arginine 1000 MG tablet Take 1 tablet by mouth 2 (Two) Times a Day. As directed 180 each 3    buPROPion SR (WELLBUTRIN SR) 150 MG 12 hr tablet Take 1 tablet by mouth Daily.  3    clopidogrel (PLAVIX) 75 MG tablet Take 1 tablet by mouth Daily. 90 tablet 3    DULoxetine (CYMBALTA) 30 MG capsule Take 1 capsule by mouth Daily.      DULoxetine (CYMBALTA) 60 MG capsule Take 1 capsule by mouth Daily.      gabapentin (NEURONTIN) 300 MG capsule Take 1 capsule by mouth 3 (Three) Times a Day.      metoprolol tartrate (LOPRESSOR) 25 MG tablet Take 1 tablet by mouth 2 (Two) Times a Day. 180 tablet 2    nitroglycerin (NITROSTAT) 0.4 MG SL tablet DISSOLVE 1 TAB UNDER TONGUE FOR CHEST PAIN - IF PAIN REMAINS AFTER 5 MIN, CALL 911 AND REPEAT DOSE. MAX 3 TABS IN 15 MINUTES 25 tablet 1    ondansetron (ZOFRAN) 4 MG tablet Take 1 tablet by mouth Every 8 (Eight) Hours As Needed for Nausea or Vomiting.      pantoprazole (PROTONIX) 40 MG EC tablet Take 1 tablet by mouth 2 (Two) Times a Day.      promethazine (PHENERGAN) 25 MG tablet Take  1 tablet by mouth Every 6 (Six) Hours As Needed for Nausea or Vomiting.      QUEtiapine (SEROquel) 100 MG tablet Take 2 tablets by mouth Every Night.      rizatriptan (MAXALT) 10 MG tablet TAKE 1 TAB AS NEEDED FOR HEADACHE. MAY REPEAT 1 TAB WITHIN 2 HOURS. MAX 2 TABS/24HRS. NO OTHER TRIPT  5    Tirzepatide-Weight Management (ZEPBOUND) 5 MG/0.5ML solution Inject 0.5 mL under the skin into the appropriate area as directed 1 (One) Time Per Week.      tiZANidine (ZANAFLEX) 4 MG tablet Take 1 tablet by mouth 2 (Two) Times a Day As Needed for Muscle Spasms.      topiramate (TOPAMAX) 200 MG tablet Take 1 tablet by mouth 2 (Two) Times a Day.       No current facility-administered medications for this visit.       Contrast dye (echo or unknown ct/mr) and Labetalol    Past Medical History:   Diagnosis Date    Frequent headaches     History of suicide attempt     History of tubal ligation     Hypertension     Hyperthyroidism     Panic attack     Seizures     Trigeminal neuralgia     Vitamin D deficiency        Social History     Socioeconomic History    Marital status:    Tobacco Use    Smoking status: Never    Smokeless tobacco: Never   Vaping Use    Vaping status: Never Used   Substance and Sexual Activity    Alcohol use: No     Comment: 1 glass of red wine month    Drug use: No    Sexual activity: Defer       Family History   Problem Relation Age of Onset    Hyperlipidemia Mother     No Known Problems Father     No Known Problems Brother        Review of Systems   Constitutional: Positive for malaise/fatigue. Negative for diaphoresis and fever.   Cardiovascular:  Positive for chest pain and palpitations. Negative for dyspnea on exertion, leg swelling and near-syncope.   Respiratory:  Negative for shortness of breath.    Endocrine: Negative for polydipsia, polyphagia and polyuria.   Hematologic/Lymphatic: Negative for bleeding problem. Does not bruise/bleed easily.   Musculoskeletal:  Positive for back pain, joint  "pain, neck pain and stiffness. Negative for falls.   Neurological:  Positive for headaches and light-headedness. Negative for seizures.        BP Readings from Last 5 Encounters:   06/17/25 110/62   06/25/24 100/58   12/12/23 120/70   05/24/23 110/64   11/22/22 118/60       Wt Readings from Last 5 Encounters:   06/17/25 63.2 kg (139 lb 6.4 oz)   06/25/24 63.5 kg (140 lb)   12/12/23 71.7 kg (158 lb)   12/04/23 69.9 kg (154 lb 1.6 oz)   05/24/23 69.9 kg (154 lb 3.2 oz)       Objective     /62 (BP Location: Left arm, Patient Position: Sitting, Cuff Size: Adult)   Pulse 86   Ht 149 cm (58.66\")   Wt 63.2 kg (139 lb 6.4 oz)   BMI 28.48 kg/m²     Vitals and nursing note reviewed.   Constitutional:       Appearance: Not in distress.   HENT:      Head: Normocephalic.   Neck:      Vascular: No carotid bruit.   Pulmonary:      Effort: Pulmonary effort is normal.      Breath sounds: Normal breath sounds.   Cardiovascular:      PMI at left midclavicular line. Normal rate. Regular rhythm.      Murmurs: There is no murmur.   Edema:     Peripheral edema absent.   Abdominal:      General: Bowel sounds are normal.      Palpations: Abdomen is soft.   Musculoskeletal:      Cervical back: Neck supple. Muscular tenderness present. Skin:     General: Skin is warm and dry.   Neurological:      Mental Status: Alert, oriented to person, place, and time and oriented to person, place and time.   Psychiatric:         Mood and Affect: Mood normal.         Speech: Speech normal.         Behavior: Behavior normal. Behavior is cooperative.         Cognition and Memory: Cognition and memory normal.            ECG 12 Lead    Date/Time: 6/18/2025 8:22 AM  Performed by: Mariela Garcia APRN    Authorized by: Mariela Garcia APRN  Comparison: compared with previous ECG from 7/1/2024  Similar to previous ECG  Rhythm: sinus rhythm  Rate: normal  BPM: 86  QRS axis: normal               Assessment & Plan   Diagnoses and all orders for this " visit:    1. Other chest pain (Primary)  -     Comprehensive Metabolic Panel; Future  -     Magnesium; Future  -     TSH; Future    2. Coronary-myocardial bridge  -     clopidogrel (PLAVIX) 75 MG tablet; Take 1 tablet by mouth Daily.  Dispense: 90 tablet; Refill: 3    3. Palpitations  -     metoprolol tartrate (LOPRESSOR) 25 MG tablet; Take 1 tablet by mouth 2 (Two) Times a Day.  Dispense: 180 tablet; Refill: 2  -     Comprehensive Metabolic Panel; Future  -     Magnesium; Future  -     TSH; Future    4. History of arterial thrombosis  -     clopidogrel (PLAVIX) 75 MG tablet; Take 1 tablet by mouth Daily.  Dispense: 90 tablet; Refill: 3    5. Metabolic syndrome    6. Tachy-horacio syndrome  -     metoprolol tartrate (LOPRESSOR) 25 MG tablet; Take 1 tablet by mouth 2 (Two) Times a Day.  Dispense: 180 tablet; Refill: 2    7. Presence of cardiac pacemaker  -     ECG 12 Lead      CP/coronary myocardial bridge  -Admits to use of Nitrostat x 1 since last visit  -If recurrence patient agrees to call office and consider adding long-acting nitrate and/or repeat cardiac workup    Palpitations  -EKG NSR  -Continue beta-blocker in form of Lopressor 25 mg.  Currently taking once a day but can increase to twice a day if needed for palpitations and monitor BP.  -Lab order given to check electrolytes and thyroid function.    History of arterial thrombus  -Remains on Plavix  -Maintain good hydration    Tachybradycardia syndrome/PPM  -EKG NSR  -Recent remote interrogation reviewed.  AT/AF at 1%  -In office pacemaker interrogation ordered    Of note patient has concerns she is perimenopausal.  She will further discuss with PCP further evaluation/management.    6-month follow-up visit scheduled.  Please call sooner for cardiac concerns.               Electronically signed by CARLOS Garcia,  June 18, 2025 08:30 EDT    Dictated Utilizing Dragon Dictation: Part of this note may be an electronic transcription/translation of spoken  language to printed text using the Dragon Dictation System.

## 2025-06-18 PROCEDURE — 93000 ELECTROCARDIOGRAM COMPLETE: CPT | Performed by: NURSE PRACTITIONER

## 2025-07-08 LAB
MDC_IDC_MSMT_BATTERY_REMAINING_LONGEVITY: 73 MO
MDC_IDC_MSMT_BATTERY_REMAINING_PERCENTAGE: 61 %
MDC_IDC_MSMT_BATTERY_RRT_TRIGGER: 2.6
MDC_IDC_MSMT_BATTERY_STATUS: NORMAL
MDC_IDC_MSMT_BATTERY_VOLTAGE: 3.01
MDC_IDC_MSMT_LEADCHNL_RA_IMPEDANCE_VALUE: 430
MDC_IDC_MSMT_LEADCHNL_RA_PACING_THRESHOLD_POLARITY: NORMAL
MDC_IDC_MSMT_LEADCHNL_RA_SENSING_INTR_AMPL: 1.2
MDC_IDC_MSMT_LEADCHNL_RV_IMPEDANCE_VALUE: 450
MDC_IDC_MSMT_LEADCHNL_RV_PACING_THRESHOLD_POLARITY: NORMAL
MDC_IDC_MSMT_LEADCHNL_RV_SENSING_INTR_AMPL: 7.8
MDC_IDC_PG_IMPLANT_DTM: NORMAL
MDC_IDC_PG_MFG: NORMAL
MDC_IDC_PG_MODEL: NORMAL
MDC_IDC_PG_SERIAL: NORMAL
MDC_IDC_PG_TYPE: NORMAL
MDC_IDC_SESS_DTM: NORMAL
MDC_IDC_SESS_TYPE: NORMAL
MDC_IDC_SET_BRADY_AT_MODE_SWITCH_RATE: 180
MDC_IDC_SET_BRADY_LOWRATE: 60
MDC_IDC_SET_BRADY_MAX_SENSOR_RATE: 130
MDC_IDC_SET_BRADY_MAX_TRACKING_RATE: 120
MDC_IDC_SET_BRADY_MODE: NORMAL
MDC_IDC_SET_BRADY_PAV_DELAY: 200
MDC_IDC_SET_BRADY_SAV_DELAY: 200
MDC_IDC_SET_LEADCHNL_RA_PACING_AMPLITUDE: 2
MDC_IDC_SET_LEADCHNL_RA_PACING_POLARITY: NORMAL
MDC_IDC_SET_LEADCHNL_RA_PACING_PULSEWIDTH: 0.5
MDC_IDC_SET_LEADCHNL_RA_SENSING_POLARITY: NORMAL
MDC_IDC_SET_LEADCHNL_RA_SENSING_SENSITIVITY: 0.5
MDC_IDC_SET_LEADCHNL_RV_PACING_AMPLITUDE: 2
MDC_IDC_SET_LEADCHNL_RV_PACING_POLARITY: NORMAL
MDC_IDC_SET_LEADCHNL_RV_PACING_PULSEWIDTH: 0.5
MDC_IDC_SET_LEADCHNL_RV_SENSING_POLARITY: NORMAL
MDC_IDC_SET_LEADCHNL_RV_SENSING_SENSITIVITY: 2
MDC_IDC_STAT_AT_BURDEN_PERCENT: 1
MDC_IDC_STAT_BRADY_RA_PERCENT_PACED: 4.3
MDC_IDC_STAT_BRADY_RV_PERCENT_PACED: 1

## 2025-08-06 ENCOUNTER — OFFICE VISIT (OUTPATIENT)
Dept: CARDIOLOGY | Facility: CLINIC | Age: 45
End: 2025-08-06
Payer: COMMERCIAL

## 2025-08-06 DIAGNOSIS — R00.2 PALPITATIONS: Primary | ICD-10-CM

## 2025-08-06 DIAGNOSIS — I49.5 TACHY-BRADY SYNDROME: ICD-10-CM

## 2025-08-26 ENCOUNTER — TELEPHONE (OUTPATIENT)
Dept: CARDIOLOGY | Facility: CLINIC | Age: 45
End: 2025-08-26
Payer: COMMERCIAL